# Patient Record
Sex: MALE | Race: WHITE | NOT HISPANIC OR LATINO | Employment: OTHER | URBAN - METROPOLITAN AREA
[De-identification: names, ages, dates, MRNs, and addresses within clinical notes are randomized per-mention and may not be internally consistent; named-entity substitution may affect disease eponyms.]

---

## 2023-10-10 ENCOUNTER — LAB (OUTPATIENT)
Dept: LAB | Facility: LAB | Age: 73
End: 2023-10-10
Payer: MEDICARE

## 2023-10-10 DIAGNOSIS — M54.16 RADICULOPATHY, LUMBAR REGION: Primary | ICD-10-CM

## 2023-10-10 PROCEDURE — 36415 COLL VENOUS BLD VENIPUNCTURE: CPT

## 2023-10-10 PROCEDURE — 82565 ASSAY OF CREATININE: CPT

## 2023-10-10 PROCEDURE — 84520 ASSAY OF UREA NITROGEN: CPT

## 2023-10-11 LAB
BUN SERPL-MCNC: 18 MG/DL (ref 6–23)
CREAT SERPL-MCNC: 1.04 MG/DL (ref 0.5–1.3)
GFR SERPL CREATININE-BSD FRML MDRD: 76 ML/MIN/1.73M*2

## 2023-10-21 ENCOUNTER — HOSPITAL ENCOUNTER (OUTPATIENT)
Dept: RADIOLOGY | Facility: HOSPITAL | Age: 73
Discharge: HOME | End: 2023-10-21
Payer: MEDICARE

## 2023-10-21 DIAGNOSIS — M54.16 RADICULOPATHY, LUMBAR REGION: ICD-10-CM

## 2023-10-21 PROCEDURE — 2550000001 HC RX 255 CONTRASTS: Performed by: PHYSICAL MEDICINE & REHABILITATION

## 2023-10-21 PROCEDURE — 72158 MRI LUMBAR SPINE W/O & W/DYE: CPT

## 2023-10-21 PROCEDURE — A9575 INJ GADOTERATE MEGLUMI 0.1ML: HCPCS | Performed by: PHYSICAL MEDICINE & REHABILITATION

## 2023-10-21 RX ORDER — GADOTERATE MEGLUMINE 376.9 MG/ML
20 INJECTION INTRAVENOUS
Status: COMPLETED | OUTPATIENT
Start: 2023-10-21 | End: 2023-10-21

## 2023-10-21 RX ADMIN — GADOTERATE MEGLUMINE 20 ML: 376.9 INJECTION INTRAVENOUS at 13:17

## 2023-11-01 ENCOUNTER — TELEMEDICINE (OUTPATIENT)
Dept: NEUROLOGY | Facility: HOSPITAL | Age: 73
End: 2023-11-01
Payer: MEDICARE

## 2023-11-01 DIAGNOSIS — G20.A1 PARKINSON'S DISEASE WITHOUT DYSKINESIA OR FLUCTUATING MANIFESTATIONS (MULTI): Primary | ICD-10-CM

## 2023-11-01 PROCEDURE — 99215 OFFICE O/P EST HI 40 MIN: CPT | Performed by: STUDENT IN AN ORGANIZED HEALTH CARE EDUCATION/TRAINING PROGRAM

## 2023-11-01 PROCEDURE — 99215 OFFICE O/P EST HI 40 MIN: CPT | Mod: GC,95 | Performed by: STUDENT IN AN ORGANIZED HEALTH CARE EDUCATION/TRAINING PROGRAM

## 2023-11-01 RX ORDER — CARBIDOPA AND LEVODOPA 25; 100 MG/1; MG/1
TABLET ORAL
Qty: 90 TABLET | Refills: 5 | Status: SHIPPED | OUTPATIENT
Start: 2023-11-01

## 2023-11-01 ASSESSMENT — UNIFIED PARKINSONS DISEASE RATING SCALE (UPDRS)
POSTURE: 1
PRONATION_SUPINATION_LEFT: 0
AMPLITUDE_RLE: 0
HANDMOVEMENTS_RIGHT: 2
CONSTANCY_TREMOR_ATREST: 1
FINGER_TAPPING_RIGHT: 2
FACIAL_EXPRESSION: 2
AMPLITUDE_LLE: 0
SPONTANEITY_OF_MOVEMENT: 1
CHAIR_RISING_SCALE: 0
GAIT: 0
AMPLITUDE_LIP_JAW: 0
AMPLITUDE_LUE: 0
FINGER_TAPPING_LEFT: 1
PRONATION_SUPINATION_RIGHT: 1
TOETAPPING_RIGHT: 2
AMPLITUDE_RUE: 2
SPEECH: 1
LEVODOPA: NO
TOETAPPING_LEFT: 1
PARKINSONS_MEDS: NO

## 2023-11-01 NOTE — PROGRESS NOTES
Subjective     Florentin Aguayo is a right handed  73 y.o. year old male who presents with No chief complaint on file..   Visit type: follow up visit virtual for untreated PD    Getting steroid injections which has helped his back pain. He is having excessive drooling which affects his speech, this is mainly at night. No dysphagia. Wife feels cognition is declining, though remains independent. He is having mild trouble keeping up with conversation. No VH. He is having secondary insomnia and wonders if related to pain meds. His motor symptoms seem to be relatively stable, micrographia bothers him mildly. Tremor is not bothersome. Walking is improving with back pain treatment. No bowel or bladder issues. He is exercising as much as he can with walking mainly.    Prior HX:  Today, he states the tremor started in the right hand a few months ago in the spring. It is intermittent and seems to be present more with walking or with rest. There are some days it is not present at all. He has not noticed tremor with activity. He does notice some shakiness in his writing. The tremor is improved by moving his right hand. He has not noticed any triggers or exacerbating factors. He denies tremor involving the left hand, head, voice, or lower extremities. He denies improvement with alcohol (very rare consumption). He denies family history of tremor or Parkinson's disease. He denies slowed movements, stiffness, or difficulty with balance or gait. He denies any use of neuroleptic medications.     He has noticed constipation for the last 2 years, in which he has had 1 bowel movement every 2-3 days. He denies any hypophonia, change in smell (but never very good). His wife has moved to the guest room as he flails his arms, kicks/moves legs like riding a bicycle, and yells out in his sleep. He has vivid dreams. He denies mood changes, VH, or memory problems.     He was told his PD-Generation testing was negative.     Home Meds:  -ASA 81  mg daily  -Atorvastatin 80 mg daily  -Chlorthalidone 25 mg daily  -Ezetimibe 10 mg daily  -Flonase  -Levothyroxine 150 mcg daily  -Lisinopril 40 mg daily  -Metformin 500 mg bid  -Metoprolol tartrate 25 mg bid    There is no problem list on file for this patient.     Past Medical History:   Diagnosis Date    Personal history of other diseases of the circulatory system     History of hypertension    Personal history of other diseases of the circulatory system     History of coronary artery disease    Personal history of other endocrine, nutritional and metabolic disease     History of diabetes mellitus      Past Surgical History:   Procedure Laterality Date    OTHER SURGICAL HISTORY  02/15/2022    Back surgery    OTHER SURGICAL HISTORY  02/15/2022    Hip surgery      Social History     Socioeconomic History    Marital status:      Spouse name: Not on file    Number of children: Not on file    Years of education: Not on file    Highest education level: Not on file   Occupational History    Not on file   Tobacco Use    Smoking status: Not on file    Smokeless tobacco: Not on file   Substance and Sexual Activity    Alcohol use: Not on file    Drug use: Not on file    Sexual activity: Not on file   Other Topics Concern    Not on file   Social History Narrative    Not on file     Social Determinants of Health     Financial Resource Strain: Not on file   Food Insecurity: Not on file   Transportation Needs: Not on file   Physical Activity: Not on file   Stress: Not on file   Social Connections: Not on file   Intimate Partner Violence: Not on file   Housing Stability: Not on file      No family history on file.              Review of Systems  All other system have been reviewed and are negative for complaint.  Objective   Neurological Exam  VV exam: reduced blink rate, EOM full range  Gait: Reduced R>L armswing ; mild shuffling ; rest tremor only present during gait    MDS UPDRS 1st Score: Motor Examination  Is the  patient on medication for treating the symptoms of Parkinson's Disease?: No  Is the patient on Levodopa?: No  Speech: 1  Facial Expression: 2  Finger Tapping Right Hand: 2  Finger Tapping Left Hand: 1  Hand Movements- Right Hand: 2  Hand Movements- Left Hand: 1  Pronatiaon-Supination Movments - Right Hand: 1  Pronatiaon-Supination Movments Left Hand: 0  Toe Tapping Right Foot: 2  Toe Tapping - Left Foot: 1  Arising from Chair: 0  Gait: 0  Posture: 1  Global Spontanteity of Movment ( Body Bradykinesia): 1  Rest Tremor Amplitude - RUE: 2  Rest Tremor Amplitude - LUE: 0  Rest Tremor Amplitude - RLE: 0  Rest Tremor Amplitude - LLE: 0  Rest Tremor Amplitude - Lip/Jaw: 0  Constancy of Rest Tremor: 1                  Assessment/Plan   Diagnoses and all orders for this visit:  Parkinson's disease without dyskinesia or fluctuating manifestations  -     carbidopa-levodopa (Sinemet)  mg tablet; 1/2 tab 3 times a day for a week, then 1 tab 3 times a day Take with meals    Florentin Aguayo is a 73 y.o. year old male here for virtual FUV for untreated PD. He has mild progression of bradykinesia and we decided to initiate Sinemet today. Tremor remains mild and only present during gait.    We discussed the following plan today:   We will plan to start a medication called carbidopa/levodopa (Sinemet) as it might help with shuffling gait, slowness, stiffness, and tremor - start with 1/2 tab three times a day for a week, then increase to 1 tab 3 times a day. Try to take it 30 minutes after meals. Side effects include dizziness, confusion, hallucinations, drowsiness, wiggly movements, and upset stomach.   An important part of the treatment is exercise. I recommend 20-30 minutes of moderate to high intensity cardiovascular exercise at least 3 times per week. Stationary bike, recumbent bike, or pool exercise can be considered if you have pain or balance issues. You can also check out the local Parkinson's exercise classes.  Mediterranean diet has also been shown to have benefits in Parkinson disease.   Return in 3 months in person ; MOCA next visit

## 2023-11-01 NOTE — PATIENT INSTRUCTIONS
Thank you for your visit today. You were seen by Dr. Nash for Parkinson disease. If you have any questions or need to reach me, call my office at [phone].    We discussed the following plan today:   We will plan to start a medication called carbidopa/levodopa (Sinemet) as it might help with shuffling gait, slowness, stiffness, and tremor - start with 1/2 tab three times a day for a week, then increase to 1 tab 3 times a day. Try to take it 30 minutes after meals. Side effects include dizziness, confusion, hallucinations, drowsiness, wiggly movements, and upset stomach.   An important part of the treatment is exercise. I recommend 20-30 minutes of moderate to high intensity cardiovascular exercise at least 3 times per week. Stationary bike, recumbent bike, or pool exercise can be considered if you have pain or balance issues. You can also check out the local Parkinson's exercise classes. Mediterranean diet has also been shown to have benefits in Parkinson disease.   Return in 3 months in person ; memory testing next visit

## 2024-01-22 ENCOUNTER — APPOINTMENT (OUTPATIENT)
Dept: RADIOLOGY | Facility: HOSPITAL | Age: 74
End: 2024-01-22
Payer: MEDICARE

## 2024-01-22 ENCOUNTER — HOSPITAL ENCOUNTER (OUTPATIENT)
Dept: RADIOLOGY | Facility: HOSPITAL | Age: 74
Discharge: HOME | End: 2024-01-22
Payer: MEDICARE

## 2024-01-22 ENCOUNTER — HOSPITAL ENCOUNTER (OUTPATIENT)
Dept: CARDIOLOGY | Facility: HOSPITAL | Age: 74
Discharge: HOME | End: 2024-01-22
Payer: MEDICARE

## 2024-01-22 ENCOUNTER — APPOINTMENT (OUTPATIENT)
Dept: CARDIOLOGY | Facility: HOSPITAL | Age: 74
End: 2024-01-22
Payer: MEDICARE

## 2024-01-22 DIAGNOSIS — I25.10 ATHEROSCLEROTIC HEART DISEASE OF NATIVE CORONARY ARTERY WITHOUT ANGINA PECTORIS: ICD-10-CM

## 2024-01-22 DIAGNOSIS — E78.00 PURE HYPERCHOLESTEROLEMIA, UNSPECIFIED: ICD-10-CM

## 2024-01-22 PROCEDURE — 93017 CV STRESS TEST TRACING ONLY: CPT

## 2024-01-22 PROCEDURE — 3430000001 HC RX 343 DIAGNOSTIC RADIOPHARMACEUTICALS: Performed by: INTERNAL MEDICINE

## 2024-01-22 PROCEDURE — 93017 CV STRESS TEST TRACING ONLY: CPT | Mod: MUE

## 2024-01-22 PROCEDURE — 93015 CV STRESS TEST SUPVJ I&R: CPT | Performed by: INTERNAL MEDICINE

## 2024-01-22 PROCEDURE — 78452 HT MUSCLE IMAGE SPECT MULT: CPT

## 2024-01-22 PROCEDURE — A9502 TC99M TETROFOSMIN: HCPCS | Performed by: INTERNAL MEDICINE

## 2024-01-22 RX ADMIN — TETROFOSMIN 35.6 MILLICURIE: 0.23 INJECTION, POWDER, LYOPHILIZED, FOR SOLUTION INTRAVENOUS at 09:42

## 2024-01-22 RX ADMIN — TETROFOSMIN 11.9 MILLICURIE: 0.23 INJECTION, POWDER, LYOPHILIZED, FOR SOLUTION INTRAVENOUS at 08:15

## 2024-01-23 ENCOUNTER — APPOINTMENT (OUTPATIENT)
Dept: RADIOLOGY | Facility: HOSPITAL | Age: 74
End: 2024-01-23
Payer: MEDICARE

## 2024-01-26 ENCOUNTER — LAB (OUTPATIENT)
Dept: LAB | Facility: LAB | Age: 74
End: 2024-01-26
Payer: MEDICARE

## 2024-01-26 DIAGNOSIS — R79.89 ELEVATED TSH: ICD-10-CM

## 2024-01-26 DIAGNOSIS — E11.9 TYPE 2 DIABETES MELLITUS WITHOUT COMPLICATIONS (MULTI): ICD-10-CM

## 2024-01-26 DIAGNOSIS — I10 ESSENTIAL (PRIMARY) HYPERTENSION: Primary | ICD-10-CM

## 2024-01-26 DIAGNOSIS — R79.89 ELEVATED TSH: Primary | ICD-10-CM

## 2024-01-26 DIAGNOSIS — E03.9 HYPOTHYROIDISM, UNSPECIFIED: ICD-10-CM

## 2024-01-26 DIAGNOSIS — E78.00 PURE HYPERCHOLESTEROLEMIA, UNSPECIFIED: ICD-10-CM

## 2024-01-26 LAB
ALBUMIN SERPL-MCNC: 3.9 G/DL (ref 3.5–5)
ALP BLD-CCNC: 52 U/L (ref 35–125)
ALT SERPL-CCNC: 21 U/L (ref 5–40)
ANION GAP SERPL CALC-SCNC: 12 MMOL/L
AST SERPL-CCNC: 25 U/L (ref 5–40)
BASOPHILS # BLD AUTO: 0.07 X10*3/UL (ref 0–0.1)
BASOPHILS NFR BLD AUTO: 1.1 %
BILIRUB SERPL-MCNC: 0.4 MG/DL (ref 0.1–1.2)
BUN SERPL-MCNC: 19 MG/DL (ref 8–25)
CALCIUM SERPL-MCNC: 9.3 MG/DL (ref 8.5–10.4)
CHLORIDE SERPL-SCNC: 101 MMOL/L (ref 97–107)
CHOLEST SERPL-MCNC: 118 MG/DL (ref 133–200)
CHOLEST/HDLC SERPL: 3.6 {RATIO}
CO2 SERPL-SCNC: 28 MMOL/L (ref 24–31)
CREAT SERPL-MCNC: 1.2 MG/DL (ref 0.4–1.6)
EGFRCR SERPLBLD CKD-EPI 2021: 63 ML/MIN/1.73M*2
EOSINOPHIL # BLD AUTO: 0.34 X10*3/UL (ref 0–0.4)
EOSINOPHIL NFR BLD AUTO: 5.1 %
ERYTHROCYTE [DISTWIDTH] IN BLOOD BY AUTOMATED COUNT: 13.2 % (ref 11.5–14.5)
EST. AVERAGE GLUCOSE BLD GHB EST-MCNC: 157 MG/DL
GLUCOSE SERPL-MCNC: 154 MG/DL (ref 65–99)
HBA1C MFR BLD: 7.1 %
HCT VFR BLD AUTO: 44.1 % (ref 41–52)
HDLC SERPL-MCNC: 33 MG/DL
HGB BLD-MCNC: 15.1 G/DL (ref 13.5–17.5)
IMM GRANULOCYTES # BLD AUTO: 0.01 X10*3/UL (ref 0–0.5)
IMM GRANULOCYTES NFR BLD AUTO: 0.2 % (ref 0–0.9)
LDLC SERPL CALC-MCNC: 59 MG/DL (ref 65–130)
LYMPHOCYTES # BLD AUTO: 1.83 X10*3/UL (ref 0.8–3)
LYMPHOCYTES NFR BLD AUTO: 27.5 %
MCH RBC QN AUTO: 31.6 PG (ref 26–34)
MCHC RBC AUTO-ENTMCNC: 34.2 G/DL (ref 32–36)
MCV RBC AUTO: 92 FL (ref 80–100)
MONOCYTES # BLD AUTO: 0.67 X10*3/UL (ref 0.05–0.8)
MONOCYTES NFR BLD AUTO: 10.1 %
NEUTROPHILS # BLD AUTO: 3.74 X10*3/UL (ref 1.6–5.5)
NEUTROPHILS NFR BLD AUTO: 56 %
NRBC BLD-RTO: 0 /100 WBCS (ref 0–0)
PLATELET # BLD AUTO: 187 X10*3/UL (ref 150–450)
POTASSIUM SERPL-SCNC: 4 MMOL/L (ref 3.4–5.1)
PROT SERPL-MCNC: 6.2 G/DL (ref 5.9–7.9)
RBC # BLD AUTO: 4.78 X10*6/UL (ref 4.5–5.9)
SODIUM SERPL-SCNC: 141 MMOL/L (ref 133–145)
T4 FREE SERPL-MCNC: 1.1 NG/DL (ref 0.9–1.7)
TRIGL SERPL-MCNC: 129 MG/DL (ref 40–150)
TSH SERPL DL<=0.05 MIU/L-ACNC: 5.03 MIU/L (ref 0.27–4.2)
WBC # BLD AUTO: 6.7 X10*3/UL (ref 4.4–11.3)

## 2024-01-26 PROCEDURE — 83036 HEMOGLOBIN GLYCOSYLATED A1C: CPT

## 2024-01-26 PROCEDURE — 80061 LIPID PANEL: CPT

## 2024-01-26 PROCEDURE — 84439 ASSAY OF FREE THYROXINE: CPT

## 2024-01-26 PROCEDURE — 36415 COLL VENOUS BLD VENIPUNCTURE: CPT

## 2024-01-26 PROCEDURE — 85025 COMPLETE CBC W/AUTO DIFF WBC: CPT

## 2024-01-26 PROCEDURE — 80053 COMPREHEN METABOLIC PANEL: CPT

## 2024-01-26 PROCEDURE — 84443 ASSAY THYROID STIM HORMONE: CPT

## 2024-01-29 PROCEDURE — 93016 CV STRESS TEST SUPVJ ONLY: CPT | Performed by: INTERNAL MEDICINE

## 2024-01-29 NOTE — ADDENDUM NOTE
Encounter addended by: Sunshine Vogel MD on: 1/29/2024 8:33 AM   Actions taken: Charge Capture section accepted

## 2024-02-06 ENCOUNTER — LAB (OUTPATIENT)
Dept: LAB | Facility: LAB | Age: 74
End: 2024-02-06
Payer: MEDICARE

## 2024-02-06 DIAGNOSIS — M79.10 MYALGIA, UNSPECIFIED SITE: Primary | ICD-10-CM

## 2024-02-06 DIAGNOSIS — K21.9 GASTROESOPHAGEAL REFLUX DISEASE WITHOUT ESOPHAGITIS: Primary | ICD-10-CM

## 2024-02-06 LAB — CK SERPL-CCNC: 286 U/L (ref 24–195)

## 2024-02-06 PROCEDURE — 82550 ASSAY OF CK (CPK): CPT

## 2024-02-06 PROCEDURE — 36415 COLL VENOUS BLD VENIPUNCTURE: CPT

## 2024-02-07 RX ORDER — PANTOPRAZOLE SODIUM 40 MG/1
40 TABLET, DELAYED RELEASE ORAL DAILY
Qty: 90 TABLET | Refills: 1 | Status: SHIPPED | OUTPATIENT
Start: 2024-02-07

## 2024-02-15 ENCOUNTER — LAB (OUTPATIENT)
Dept: LAB | Facility: LAB | Age: 74
End: 2024-02-15
Payer: MEDICARE

## 2024-02-15 DIAGNOSIS — M79.10 MYALGIA, UNSPECIFIED SITE: Primary | ICD-10-CM

## 2024-02-15 LAB — CK SERPL-CCNC: 484 U/L (ref 24–195)

## 2024-02-15 PROCEDURE — 82550 ASSAY OF CK (CPK): CPT

## 2024-02-15 PROCEDURE — 36415 COLL VENOUS BLD VENIPUNCTURE: CPT

## 2024-02-16 ENCOUNTER — LAB (OUTPATIENT)
Dept: LAB | Facility: LAB | Age: 74
End: 2024-02-16
Payer: MEDICARE

## 2024-02-16 DIAGNOSIS — M79.10 MYALGIA, UNSPECIFIED SITE: Primary | ICD-10-CM

## 2024-02-16 LAB
ANION GAP SERPL CALC-SCNC: 12 MMOL/L
BUN SERPL-MCNC: 20 MG/DL (ref 8–25)
CALCIUM SERPL-MCNC: 9.4 MG/DL (ref 8.5–10.4)
CHLORIDE SERPL-SCNC: 100 MMOL/L (ref 97–107)
CO2 SERPL-SCNC: 28 MMOL/L (ref 24–31)
CREAT SERPL-MCNC: 1.1 MG/DL (ref 0.4–1.6)
EGFRCR SERPLBLD CKD-EPI 2021: 70 ML/MIN/1.73M*2
GLUCOSE SERPL-MCNC: 168 MG/DL (ref 65–99)
POTASSIUM SERPL-SCNC: 3.8 MMOL/L (ref 3.4–5.1)
SODIUM SERPL-SCNC: 140 MMOL/L (ref 133–145)

## 2024-02-16 PROCEDURE — 80048 BASIC METABOLIC PNL TOTAL CA: CPT

## 2024-02-16 PROCEDURE — 36415 COLL VENOUS BLD VENIPUNCTURE: CPT

## 2024-02-19 DIAGNOSIS — E61.1 LOW IRON: ICD-10-CM

## 2024-02-19 RX ORDER — FERROUS GLUCONATE 324(38)MG
1 TABLET ORAL DAILY
Qty: 90 TABLET | Refills: 1 | Status: SHIPPED | OUTPATIENT
Start: 2024-02-19

## 2024-02-20 ENCOUNTER — EVALUATION (OUTPATIENT)
Dept: PHYSICAL THERAPY | Facility: CLINIC | Age: 74
End: 2024-02-20
Payer: MEDICARE

## 2024-02-20 DIAGNOSIS — M48.062 SPINAL STENOSIS, LUMBAR REGION WITH NEUROGENIC CLAUDICATION: ICD-10-CM

## 2024-02-20 PROCEDURE — 97161 PT EVAL LOW COMPLEX 20 MIN: CPT | Mod: GP

## 2024-02-20 PROCEDURE — 97110 THERAPEUTIC EXERCISES: CPT | Mod: GP

## 2024-02-20 ASSESSMENT — PAIN SCALES - GENERAL: PAINLEVEL_OUTOF10: 2

## 2024-02-20 ASSESSMENT — PAIN - FUNCTIONAL ASSESSMENT: PAIN_FUNCTIONAL_ASSESSMENT: 0-10

## 2024-02-20 NOTE — PROGRESS NOTES
Physical Therapy Evaluation    Patient Name: Florentin Aguayo  MRN: [id number]  Evaluation Date: 2/20/2024  Time Calculation  Start Time: 1005  Stop Time: 1045  Time Calculation (min): 40 min  PT Evaluation Time Entry  PT Evaluation (Low) Time Entry: 29     PT Therapeutic Procedures Time Entry  Therapeutic Exercise Time Entry: 11    1. Spinal stenosis, lumbar region with neurogenic claudication  Referral to Physical Therapy    Follow Up In Physical Therapy           Subjective   Patient reported hx of injury: Pt has long hx of low back pain and dysfunction leading into surgery in 2004 and fusion L3-S1 in 2022. Pt was doing well in pain and function until this fall when pain returned. Pt notices pain across the low back in standing, weakness in the LLE, as well as cramping in the L thigh. Pt has some foot drag with LLE, denies falls. Pt has done PT in the past on land and in the pool. He currently is doing independent exercise in the pool, returns to therapy now to progress land-based exercise. Pt was also recommended for injections, but is adjusting heart medication before considering injections.     Surgery:   2022 L3-S1 fusion.     Precautions:  Precautions  Precautions Comment: Hx fusion 2022, no remaining restrictions    Relevant PMH:  Hx L TKA 2014, L3-S1 fusion 2022, thyroid disorder, diabetes, heart disease, HTN, Parkinson's     Red flags: negative    Pain:  Pain Assessment: 0-10  Pain Score: 2 (Pain at max 8/10)  Pain Type: Chronic pain  Pain Location: Back  Pain Orientation: Lower  Pain Radiating Towards: radiates into anterior L thigh  Effect of Pain on Daily Activities: Limits standing, walking to 5 minutes max, weakness with ascending stairs, household chores, donning shoes/socks    Home Living:  Home type: House  Stairs: Yes  Lives with: Spouse  Occupation: retired  Hobbies/activities: exercising, walking    Prior Function Per Pt/Caregiver Report:  Functionally independent with some modifications, no  pain    Imaging:  October 2023:  IMPRESSION:  1. Interval postsurgical changes laminectomy and posterior spinal  fusion from L3 through S1. Remote postsurgical changes of laminectomy  at L2-3.  2. Facet osteoarthropathy and scarring is seen at the L2-3 level  which contributes to mild central canal and neural foraminal  narrowing.    OBJECTIVE:  Objective   Posture:  Posture Comment: Mild flexed trunk, forward head, rounded shoulders  Range of Motion:  Lumbar ROM Range   Flexion 50% limited, painful   Extension 25% limited   R rotation 25% limited   L rotation 25% limited   R sidebend 50% limited, tightness felt   L sidebend 50% limited, tightness felt     Strength:  LE MMT L R   Hip flexion 4/5 4+/5   Hip extension 4/5 4+/5   Hip abduction 4/5 4+/5   Hip adduction 4/5 4+/5   Knee flexion 4+/5 5/5   Knee extension 4+/5 5/5   Ankle DF 4/5 4+/5     Flexibility:  Flexibility Comment: Very tight B hip flexors, glutes/piriformis, hamstrings, gastroc  Palpation:  Palpation Comment: mild tenderness L>R lumbar paraspinals  Special Tests:  Special Tests Comment: negative slump, SLR  Gait:  Gait Comment: decreased mary, increased time in double stance, mild increase in L knee and hip flexion during swing phase to compensate for foot drag. Pain increases with distance.  Stairs:  Stairs Comment: Ascends nonreciprocally with pain, descends reciprocally  Bed Mobility:  Bed Mobility Comment: Independent with mild pain  Transfers:  Transfers Comment: Independent with sit to stand, mild pain      Outcome Measures:  Other Measures  5x Sit to Stand: 15.8s with some pain  Oswestry Disablity Index (NBA): 58%     Assessment  PT Assessment Results: Decreased strength, Decreased range of motion, Decreased mobility, Pain  Rehab Prognosis: Good    Pt is a 73 y.o. male who presents with impairments of LBP with L sided radiculopathy, core and BLE weakness, decreased flexibility, decreased lumbar ROM, and faulty gait. These impairments have  led to functional limitations including modified performance of ADLs and limited tolerance of standing, walking, stairs, lifting, and household tasks. Pt would benefit from skilled physical therapy intervention to improve above impairments and facilitate return to function.    Plan  Treatment/Interventions: Aquatic therapy, Education/ Instruction, Electrical stimulation, Manual therapy, Neuromuscular re-education, Taping techniques, Therapeutic activities, Therapeutic exercises, Ultrasound  PT Plan: Skilled PT  PT Frequency: 1 time per week  Duration: 8 visits  Certification Period Start Date: 02/20/24  Certification Period End Date: 05/20/24  Number of Treatments Authorized: MN  Rehab Potential: Good  Plan of Care Agreement: Patient    Plan for next visit: progress flexibility, core stability    OP EDUCATION:  Outpatient Education  Individual(s) Educated: Patient  Education Provided: Body Mechanics, Anatomy, Home Exercise Program, POC, Posture  Diagnosis and Precautions: Low back pain with LLE radiculopathy, decreased functional mobility  Risk and Benefits Discussed with Patient/Caregiver/Other: yes  Patient/Caregiver Demonstrated Understanding: yes  Plan of Care Discussed and Agreed Upon: yes  Patient Response to Education: Patient/Caregiver Verbalized Understanding of Information, Patient/Caregiver Performed Return Demonstration of Exercises/Activities, Patient/Caregiver Asked Appropriate Questions    Today's Treatment:  Therapeutic Exercise  HEP to be completed daily, exercises include:  Figure 4 piriformis stretch  SKTC stretch  90/90 hamstring stretch  Seated gastroc stretch    Goals:  Active       PT Problem       STG, 4 visits:       Start:  02/20/24    Expected End:  04/05/24       Pt will be independent in HEP to improve LE and core strength, mobility, and function.  Pt will report 50% reduction in pain with functional mobility such as standing, walking, and stairs.         Pt will increase strength in  core and LEs by 1/2 MMT in all planes for improved performance of functional mobility.        Start:  02/20/24    Expected End:  05/20/24            Pt will demonstrate no more than 25% limitation in lumbar AROM without pain in all planes for improved performance of ADLs.        Start:  02/20/24    Expected End:  05/20/24            Pt will ambulate community distances across all surfaces without deviation and without pain.       Start:  02/20/24    Expected End:  05/20/24            Pt will ascend/descend 2 flights of stairs reciprocally without deviation and without pain for improved household and community mobility.       Start:  02/20/24    Expected End:  05/20/24            Pt will tolerate >30 minutes of standing activity without pain for improved tolerance of household tasks.       Start:  02/20/24    Expected End:  05/20/24            Pt will perform all LE dressing without pain, limitation, or modification.       Start:  02/20/24    Expected End:  05/20/24

## 2024-02-22 ENCOUNTER — TELEPHONE (OUTPATIENT)
Dept: PRIMARY CARE | Facility: CLINIC | Age: 74
End: 2024-02-22

## 2024-02-22 ENCOUNTER — LAB (OUTPATIENT)
Dept: LAB | Facility: LAB | Age: 74
End: 2024-02-22
Payer: MEDICARE

## 2024-02-22 ENCOUNTER — OFFICE VISIT (OUTPATIENT)
Dept: PRIMARY CARE | Facility: CLINIC | Age: 74
End: 2024-02-22
Payer: MEDICARE

## 2024-02-22 VITALS
BODY MASS INDEX: 40.09 KG/M2 | SYSTOLIC BLOOD PRESSURE: 122 MMHG | RESPIRATION RATE: 18 BRPM | DIASTOLIC BLOOD PRESSURE: 78 MMHG | HEART RATE: 61 BPM | WEIGHT: 280 LBS | HEIGHT: 70 IN | OXYGEN SATURATION: 94 %

## 2024-02-22 DIAGNOSIS — E03.9 HYPOTHYROIDISM, UNSPECIFIED TYPE: ICD-10-CM

## 2024-02-22 DIAGNOSIS — M79.10 MYALGIA, UNSPECIFIED SITE: Primary | ICD-10-CM

## 2024-02-22 DIAGNOSIS — Z00.00 WELL ADULT EXAM: Primary | ICD-10-CM

## 2024-02-22 DIAGNOSIS — E78.00 HYPERCHOLESTEROLEMIA: ICD-10-CM

## 2024-02-22 DIAGNOSIS — I25.10 ARTERIOSCLEROSIS OF CORONARY ARTERY: ICD-10-CM

## 2024-02-22 DIAGNOSIS — I10 BENIGN ESSENTIAL HYPERTENSION: ICD-10-CM

## 2024-02-22 PROBLEM — B35.1 ONYCHOMYCOSIS: Status: RESOLVED | Noted: 2019-07-18 | Resolved: 2024-02-22

## 2024-02-22 PROBLEM — E11.9 DIABETES (MULTI): Status: ACTIVE | Noted: 2021-09-14

## 2024-02-22 PROBLEM — Z86.79 HISTORY OF HYPERTENSION: Status: ACTIVE | Noted: 2024-02-22

## 2024-02-22 PROBLEM — G20.A1 PARKINSON'S DISEASE (MULTI): Status: ACTIVE | Noted: 2022-10-04

## 2024-02-22 PROBLEM — G47.30 SLEEP APNEA: Status: RESOLVED | Noted: 2018-05-17 | Resolved: 2024-02-22

## 2024-02-22 PROBLEM — K21.9 CHRONIC GERD: Status: ACTIVE | Noted: 2019-09-04

## 2024-02-22 PROBLEM — N18.2 STAGE 2 CHRONIC KIDNEY DISEASE: Status: ACTIVE | Noted: 2022-10-04

## 2024-02-22 PROBLEM — Z86.0100 PERSONAL HISTORY OF COLONIC POLYPS: Status: RESOLVED | Noted: 2024-02-22 | Resolved: 2024-02-22

## 2024-02-22 PROBLEM — N40.0 BENIGN PROSTATIC HYPERPLASIA: Status: ACTIVE | Noted: 2024-02-22

## 2024-02-22 PROBLEM — H91.90 HEARING LOSS: Status: RESOLVED | Noted: 2022-01-06 | Resolved: 2024-02-22

## 2024-02-22 PROBLEM — H90.3 ASYMMETRIC SNHL (SENSORINEURAL HEARING LOSS): Status: ACTIVE | Noted: 2024-02-22

## 2024-02-22 PROBLEM — Z86.010 PERSONAL HISTORY OF COLONIC POLYPS: Status: RESOLVED | Noted: 2024-02-22 | Resolved: 2024-02-22

## 2024-02-22 PROBLEM — Z86.39 HISTORY OF DIABETES MELLITUS: Status: ACTIVE | Noted: 2024-02-22

## 2024-02-22 PROBLEM — D50.0 IRON DEFICIENCY ANEMIA DUE TO CHRONIC BLOOD LOSS: Status: ACTIVE | Noted: 2024-02-22

## 2024-02-22 PROBLEM — M16.9 OSTEOARTHRITIS OF HIP: Status: RESOLVED | Noted: 2022-03-31 | Resolved: 2024-02-22

## 2024-02-22 PROBLEM — M43.26 FUSION OF LUMBAR SPINE: Status: RESOLVED | Noted: 2022-11-01 | Resolved: 2024-02-22

## 2024-02-22 PROBLEM — G47.31 CENTRAL SLEEP APNEA SYNDROME: Status: ACTIVE | Noted: 2024-02-22

## 2024-02-22 LAB — CK SERPL-CCNC: 326 U/L (ref 24–195)

## 2024-02-22 PROCEDURE — 3078F DIAST BP <80 MM HG: CPT | Performed by: FAMILY MEDICINE

## 2024-02-22 PROCEDURE — 1170F FXNL STATUS ASSESSED: CPT | Performed by: FAMILY MEDICINE

## 2024-02-22 PROCEDURE — 1157F ADVNC CARE PLAN IN RCRD: CPT | Performed by: FAMILY MEDICINE

## 2024-02-22 PROCEDURE — 99213 OFFICE O/P EST LOW 20 MIN: CPT | Performed by: FAMILY MEDICINE

## 2024-02-22 PROCEDURE — G0439 PPPS, SUBSEQ VISIT: HCPCS | Performed by: FAMILY MEDICINE

## 2024-02-22 PROCEDURE — 1036F TOBACCO NON-USER: CPT | Performed by: FAMILY MEDICINE

## 2024-02-22 PROCEDURE — 1125F AMNT PAIN NOTED PAIN PRSNT: CPT | Performed by: FAMILY MEDICINE

## 2024-02-22 PROCEDURE — 82550 ASSAY OF CK (CPK): CPT

## 2024-02-22 PROCEDURE — 3074F SYST BP LT 130 MM HG: CPT | Performed by: FAMILY MEDICINE

## 2024-02-22 PROCEDURE — 1159F MED LIST DOCD IN RCRD: CPT | Performed by: FAMILY MEDICINE

## 2024-02-22 PROCEDURE — 3051F HG A1C>EQUAL 7.0%<8.0%: CPT | Performed by: FAMILY MEDICINE

## 2024-02-22 PROCEDURE — 36415 COLL VENOUS BLD VENIPUNCTURE: CPT

## 2024-02-22 PROCEDURE — 3048F LDL-C <100 MG/DL: CPT | Performed by: FAMILY MEDICINE

## 2024-02-22 RX ORDER — METOPROLOL SUCCINATE 50 MG/1
1 TABLET, EXTENDED RELEASE ORAL DAILY
COMMUNITY

## 2024-02-22 RX ORDER — LEVOTHYROXINE SODIUM 150 UG/1
150 TABLET ORAL DAILY
COMMUNITY
End: 2024-04-05

## 2024-02-22 RX ORDER — CHLORTHALIDONE 25 MG/1
1 TABLET ORAL DAILY
COMMUNITY
Start: 2020-09-21

## 2024-02-22 RX ORDER — DEXTROSE 4 G
TABLET,CHEWABLE ORAL
COMMUNITY
Start: 2022-11-01

## 2024-02-22 RX ORDER — METFORMIN HYDROCHLORIDE 500 MG/1
TABLET ORAL EVERY 12 HOURS
COMMUNITY
End: 2024-03-14

## 2024-02-22 RX ORDER — BLOOD SUGAR DIAGNOSTIC
STRIP MISCELLANEOUS
COMMUNITY
End: 2024-03-06

## 2024-02-22 RX ORDER — ACETAMINOPHEN 160 MG/5ML
SUSPENSION, ORAL (FINAL DOSE FORM) ORAL
COMMUNITY
Start: 2023-12-15

## 2024-02-22 RX ORDER — FLUTICASONE PROPIONATE 50 MCG
SPRAY, SUSPENSION (ML) NASAL
COMMUNITY
Start: 2019-12-24

## 2024-02-22 RX ORDER — NAPROXEN SODIUM 220 MG/1
TABLET, FILM COATED ORAL
COMMUNITY

## 2024-02-22 ASSESSMENT — ENCOUNTER SYMPTOMS
DEPRESSION: 0
OCCASIONAL FEELINGS OF UNSTEADINESS: 0
LOSS OF SENSATION IN FEET: 0

## 2024-02-22 ASSESSMENT — ACTIVITIES OF DAILY LIVING (ADL)
DRESSING: INDEPENDENT
TAKING_MEDICATION: INDEPENDENT
MANAGING_FINANCES: INDEPENDENT
GROCERY_SHOPPING: INDEPENDENT
BATHING: INDEPENDENT
DOING_HOUSEWORK: INDEPENDENT

## 2024-02-22 ASSESSMENT — PATIENT HEALTH QUESTIONNAIRE - PHQ9
SUM OF ALL RESPONSES TO PHQ9 QUESTIONS 1 AND 2: 0
2. FEELING DOWN, DEPRESSED OR HOPELESS: NOT AT ALL
1. LITTLE INTEREST OR PLEASURE IN DOING THINGS: NOT AT ALL

## 2024-02-22 ASSESSMENT — PAIN SCALES - GENERAL: PAINLEVEL: 2

## 2024-02-22 NOTE — ASSESSMENT & PLAN NOTE
Continue Toprol, Altace and chlorthalidone and following with Dr. Hahn.   Secondary Intention Text (Leave Blank If You Do Not Want): The defect will heal with secondary intention.

## 2024-02-22 NOTE — PROGRESS NOTES
Subjective   Reason for Visit: Florentin Aguayo is an 73 y.o. male here for a Medicare Wellness visit.     Past Medical, Surgical, and Family History reviewed and updated in chart.    Reviewed all medications by prescribing practitioner or clinical pharmacist (such as prescriptions, OTCs, herbal therapies and supplements) and documented in the medical record.    HPI    Patient Care Team:  Lester Saenz MD as PCP - General  Lester Saenz MD as PCP - [employer] PCP  Lester Saenz MD as Primary Care Provider   He has a history of  polyps on colonoscopy remotely.  Colonoscopy by Dr. Bello in 10/17 showed gastritis and colitis but no polyps.      2. FLORENTIN is seen today also for follow up of High cholesterol.  He was on a atorvastatin but this was switched to Crestor by Dr. Hahn.  He subsequently had a reaction including elevated creatinine kinase and has been off of this.  He has not been replaced yet until he recovers from his elevated CPK.   Recent LDL is 59.     3. FLORENTIN is here also for follow up of benign essential hypertension.  He is on Toprol, Altace and chorthalidone by Dr. Hahn.     4. FLORENTIN is seen also for follow up of Hypothyroidism.  He is on levothyroxine 175  mcg.   recent TSH is Elevated with a normal thyroxine.     5. FLORENTIN is seen for also complaining of for GERD.  He is on Protonix.     6. FLORENTIN is seen today for follow-up of coronary artery disease.  He is S/P stent placement by Dr. Hahn.     7. FLORENTIN is seen also for follow up of Diabetes 2, non insulin requiring.  He is on metformin 500 mg BID . He is following a ADA diet. Recent A1 c is 7.1.      8.   He is also here for follow up of sleep apnea.  He was using CPAP machine but has found it to be too difficult to wherein he stopped using it.      9.   He  is also here for follow-up of anemia.  Gastrointestinal workup in 10/17 showed mild gastritis and colitis.   He was on iron but stopped this a year to go.   Recent hemoglobin is 11.7.      10. He is also here for follow-up of lumbar radiculitis.  He is status post lumbosacral fusion by Dr. Saleem in 10/22.    Review of Systems  Denies headache, blurred vision, chest pain, shortness of breath, nausea or vomiting, change in bowel habits or leg pain or swelling.    Objective   Vitals:  There were no vitals taken for this visit.      Physical Exam  General appearance: Vital signs have been reviewed.  Comfortable.  Well-nourished and well-developed.He is alert and oriented x3 and appears his stated age.The patient is cooperative with exam.  Head: Hair pattern reveals a normal pattern for patient's age and face shows no abnormalities.  Eyes: PERRLA x2, EOMI x2, conjunctive a and sclera are clear.  Ears: External bilateral ears with normal helix, tragus and earlobe.Bilateral canals are normal.Bilateral tympanic membranes are pearly gray and landmarks are well visualized.  Nose: Nasal mucosa both nostrils reveals no polyps, ulcerations or lesions.  Throat:Teeth are in good repair.  Posterior pharynx reveals no abnormalities.  Neck: Supple without lymphadenopathy, thyromegaly or carotid bruits.  Lungs:Clear to auscultation bilaterally with no wheezes, rales or rhonchi.  Cardiovascular: RRR without MRG.No carotid bruits.  Extremities without edema and pulses are intact.  Abdomen: Soft, NT, no masses, no hepatosplenomegaly.  Genitalia: No testicular masses.  No evidence of inguinal hernia.Nontender.  Rectal: No masses.  Prostate is normal in size and shape with no nodules. It is nontender.  Musculoskeletal: 5/5 and equal strength in bilateral upper and lower extremities.  Skin: Good turgor and without rashes.  Neurological: Good overall strength and no focal deficits.  Cranial nerves II through XII are grossly intact.  Psychiatric: Patient has appropriate judgment with good insight.  Mood is appropriate.    Assessment/Plan   Problem List Items Addressed This Visit    None

## 2024-02-26 ENCOUNTER — TREATMENT (OUTPATIENT)
Dept: PHYSICAL THERAPY | Facility: CLINIC | Age: 74
End: 2024-02-26
Payer: MEDICARE

## 2024-02-26 DIAGNOSIS — M48.062 SPINAL STENOSIS, LUMBAR REGION WITH NEUROGENIC CLAUDICATION: ICD-10-CM

## 2024-02-26 PROCEDURE — 97140 MANUAL THERAPY 1/> REGIONS: CPT | Mod: GP,CQ

## 2024-02-26 PROCEDURE — 97110 THERAPEUTIC EXERCISES: CPT | Mod: GP,CQ

## 2024-02-26 ASSESSMENT — PAIN SCALES - GENERAL: PAINLEVEL_OUTOF10: 2

## 2024-02-26 ASSESSMENT — PAIN - FUNCTIONAL ASSESSMENT: PAIN_FUNCTIONAL_ASSESSMENT: 0-10

## 2024-02-26 NOTE — TELEPHONE ENCOUNTER
Patient made 6 month appt after CPE. I could not determine if he would need labs or why he needed appt.

## 2024-02-26 NOTE — PROGRESS NOTES
"Physical Therapy Treatment    Patient Name: Florentin Aguayo  MRN: [id number]  Encounter date:  2/26/2024  Time Calculation  Start Time: 0933  Stop Time: 1018  Time Calculation (min): 45 min     PT Therapeutic Procedures Time Entry  Manual Therapy Time Entry: 27  Therapeutic Exercise Time Entry: 14    Visit Number:  2 (including evaluation)  Planned total visits: 7  Visit Authorized/insurance coverage:  7      Current Problem  1. Spinal stenosis, lumbar region with neurogenic claudication  Follow Up In Physical Therapy          Surgery  2022 L3-S1 fusion.        Precautions   Precautions Comment: Hx fusion 2022, no remaining restrictions       Pain  Pain Assessment: 0-10  Pain Score: 2  Pain Type: Chronic pain  Pain Location: Back  Pain Orientation: Lower  Pain Radiating Towards: radiates into anterior L thgh    Subjective  General  General Comment: Patient reports that he does not have pain while sitting but quickly increases when standing across lower back when standing or walking for very short periods..     Relevant PMH:  Hx L TKA 2014, L3-S1 fusion 2022, thyroid disorder, diabetes, heart disease, HTN, Parkinson's      Red flags: negative    Objective  Poor/fair transfers secondary to pain.     Treatment:  Therapeutic Exercise  Therapeutic Exercise Performed: Yes  upine HS stretch with strap 3 x 20 sec R/L  Supine piriformis stretch with strap 3 x20 sec R/L  Seated abd bracing 5\" x20   Billed Treatment Times:  Therapeutic Exercise 14 min    Current HEP:  Supine HS stretch with strap 3 x 20 sec R/L  Supine piriformis stretch with strap 3 x20 sec R/L  Seated abd bracing 5\" x20     Manual Therapy  Manual Therapy Performed: Yes  Manual:    MFR/S lumbar paraspinals  L piriformis DTM/release  MFR/STM L ITB, L thigh from groin to proximal knee  Billed Treatment Times:  Manual Therapy  27 min        Assessment:  PT Assessment  Assessment Comment: Patient displays tightness in low lumbar back, L thigh, piriformis, ITB  and " thigh. He tolerated manual tx well with decreased tightness in all these areas.  He does have difficulty performing therapeutic exercises secondary to positioning and poor/fair transfers.  He anna benefit from seated exercises in future visits.  Pt's response to treatment:  Fair      Pain end of session:  2/10 for standing but anticipates 8/10 by the time he walks to his car.    Plan:     Continue with current POC with the following changes Add stretching and strengthening as tolerable.     Assessment of current progress against goals:  Insufficient treatment time to assess progress    Goals:  Active       PT Problem       STG, 4 visits:       Start:  02/20/24    Expected End:  04/05/24       Pt will be independent in HEP to improve LE and core strength, mobility, and function.  Pt will report 50% reduction in pain with functional mobility such as standing, walking, and stairs.         Pt will increase strength in core and LEs by 1/2 MMT in all planes for improved performance of functional mobility.        Start:  02/20/24    Expected End:  05/20/24            Pt will demonstrate no more than 25% limitation in lumbar AROM without pain in all planes for improved performance of ADLs.        Start:  02/20/24    Expected End:  05/20/24            Pt will ambulate community distances across all surfaces without deviation and without pain.       Start:  02/20/24    Expected End:  05/20/24            Pt will ascend/descend 2 flights of stairs reciprocally without deviation and without pain for improved household and community mobility.       Start:  02/20/24    Expected End:  05/20/24            Pt will tolerate >30 minutes of standing activity without pain for improved tolerance of household tasks.       Start:  02/20/24    Expected End:  05/20/24            Pt will perform all LE dressing without pain, limitation, or modification.       Start:  02/20/24    Expected End:  05/20/24

## 2024-03-04 ENCOUNTER — APPOINTMENT (OUTPATIENT)
Dept: PHYSICAL THERAPY | Facility: CLINIC | Age: 74
End: 2024-03-04
Payer: MEDICARE

## 2024-03-05 ENCOUNTER — TREATMENT (OUTPATIENT)
Dept: PHYSICAL THERAPY | Facility: CLINIC | Age: 74
End: 2024-03-05
Payer: MEDICARE

## 2024-03-05 DIAGNOSIS — M48.062 SPINAL STENOSIS, LUMBAR REGION WITH NEUROGENIC CLAUDICATION: ICD-10-CM

## 2024-03-05 PROCEDURE — 97110 THERAPEUTIC EXERCISES: CPT | Mod: GP | Performed by: PHYSICAL THERAPIST

## 2024-03-05 NOTE — PROGRESS NOTES
"Physical Therapy Treatment    Patient Name: Florentin Aguayo  MRN: [id number]  Encounter date:  3/5/2024  Time Calculation  Start Time: 0926  Stop Time: 1005  Time Calculation (min): 39 min     PT Therapeutic Procedures Time Entry  Therapeutic Exercise Time Entry: 39    Visit Number:  3 (including evaluation)  Planned total visits: 7  Visit Authorized/insurance coverage:  7      Current Problem  1. Spinal stenosis, lumbar region with neurogenic claudication  Follow Up In Physical Therapy          Surgery  2022 L3-S1 fusion.        Precautions   Precautions Comment: Hx fusion 2022, no remaining restrictions       Pain   Pain Assessment: 0-10  Pain Score: 2 (Pain at max 8/10)  Pain Type: Chronic pain, ache  Pain Location: Back  Pain Orientation: Lower      Subjective  General   Pt gets LB injections today.  No adverse response from last response, possible mild decr in pain with HEP.    Pt states his primary interest for PT is to learn machines to use for resistance training.     Relevant PMH:  Hx L TKA 2014, L3-S1 fusion 2022, thyroid disorder, diabetes, heart disease, HTN, Parkinson's      Red flags: negative    Objective  Poor/fair transfers secondary to pain.     Treatment:     TE's to incr strength and stability:  Supine:  TrA 5\" x 10  PPT 5\" 2 x 10  Iso hip add with ball and TrA  5\" 2 x 10  Hip abd with GTB and TrA 2 x 10  B sh flexion with rainbow ball with TrA x 10 and x 10 diagonals    Stand:  Cybex with staggered stance and TrA:  Rows 10# 2x 10  Sh ext 5# 2 x 10    Seated:  Pull downs 40# x 12      --------------DNP below--------------------  supine HS stretch with strap 3 x 20 sec R/L  Supine piriformis stretch with strap 3 x20 sec R/L  Seated abd bracing 5\" x20   Billed Treatment Times:  Therapeutic Exercise 14 min    Current HEP:  Supine HS stretch with strap 3 x 20 sec R/L  Supine piriformis stretch with strap 3 x20 sec R/L  Seated abd bracing 5\" x20        Manual:    MFR/S lumbar paraspinals  L piriformis " DTM/release  MFR/STM L ITB, L thigh from groin to proximal knee  Billed Treatment Times:  Manual Therapy  27 min        Assessment:     Pt's response to treatment:  mild discomfort with PPT release.  Good pace with te's after cues      Pain end of session:  2/10 walking    Plan:     Continue with current POC with the following changes consider supine hip flexor stretch     Assessment of current progress against goals:  Insufficient treatment time to assess progress    Goals:  Active       PT Problem       STG, 4 visits:       Start:  02/20/24    Expected End:  04/05/24       Pt will be independent in HEP to improve LE and core strength, mobility, and function.  Pt will report 50% reduction in pain with functional mobility such as standing, walking, and stairs.         Pt will increase strength in core and LEs by 1/2 MMT in all planes for improved performance of functional mobility.        Start:  02/20/24    Expected End:  05/20/24            Pt will demonstrate no more than 25% limitation in lumbar AROM without pain in all planes for improved performance of ADLs.        Start:  02/20/24    Expected End:  05/20/24            Pt will ambulate community distances across all surfaces without deviation and without pain.       Start:  02/20/24    Expected End:  05/20/24            Pt will ascend/descend 2 flights of stairs reciprocally without deviation and without pain for improved household and community mobility.       Start:  02/20/24    Expected End:  05/20/24            Pt will tolerate >30 minutes of standing activity without pain for improved tolerance of household tasks.       Start:  02/20/24    Expected End:  05/20/24            Pt will perform all LE dressing without pain, limitation, or modification.       Start:  02/20/24    Expected End:  05/20/24

## 2024-03-06 DIAGNOSIS — E11.9 TYPE 2 DIABETES MELLITUS WITHOUT COMPLICATION, WITHOUT LONG-TERM CURRENT USE OF INSULIN (MULTI): ICD-10-CM

## 2024-03-06 RX ORDER — BLOOD SUGAR DIAGNOSTIC
STRIP MISCELLANEOUS
Qty: 100 STRIP | Refills: 3 | Status: SHIPPED | OUTPATIENT
Start: 2024-03-06

## 2024-03-11 ENCOUNTER — TREATMENT (OUTPATIENT)
Dept: PHYSICAL THERAPY | Facility: CLINIC | Age: 74
End: 2024-03-11
Payer: MEDICARE

## 2024-03-11 DIAGNOSIS — M48.062 SPINAL STENOSIS, LUMBAR REGION WITH NEUROGENIC CLAUDICATION: ICD-10-CM

## 2024-03-11 PROCEDURE — 97110 THERAPEUTIC EXERCISES: CPT | Mod: GP,CQ

## 2024-03-11 ASSESSMENT — PAIN - FUNCTIONAL ASSESSMENT: PAIN_FUNCTIONAL_ASSESSMENT: 0-10

## 2024-03-11 ASSESSMENT — PAIN SCALES - GENERAL: PAINLEVEL_OUTOF10: 2

## 2024-03-11 NOTE — PROGRESS NOTES
"Physical Therapy Treatment    Patient Name: Florentin Aguayo  MRN: [id number]  Encounter date:  3/11/2024  Time Calculation  Start Time: 1014  Stop Time: 1100  Time Calculation (min): 46 min     PT Therapeutic Procedures Time Entry  Therapeutic Exercise Time Entry: 42    Visit Number:  4 (including evaluation)  Planned total visits: 7  Visit Authorized/insurance coverage:  7      Current Problem  1. Spinal stenosis, lumbar region with neurogenic claudication  Follow Up In Physical Therapy          Surgery  2022 L3-S1 fusion.           Precautions  Precautions  Precautions Comment: Precautions Comment: Hx fusion 2022, no remaining restrictions      Pain  Pain Assessment: 0-10  Pain Score: 2  Pain Type: Chronic pain  Pain Location: Back  Pain Orientation: Lower    Subjective  General  General Comment: Patient reports he had no increase in sx after the initiation of cybex exercises at last session.  He continues to report that pain increases when standing and decreases when sitting.         Objective  Poor/fair transfers secondary to pain. Patient performs slow supine to sit and notes fear of falling off mat.     Treatment:  Therapeutic Exercise  TE's to incr strength and stability:  Supine:  TrA 5\" x 20  PPT 5\" x10  Iso hip add with ball and TrA  5\" 2 x 10  Hip abd with rainbow ball with TrA x 10 and x 10 diagonals       Stand:  Cybex with staggered stance and TrA:  Rows 20# 2x 10  Sh ext 5# 2 x 10     Seated:  Pull downs 20# x20,   LAQs with 1.5# weights 2x10 R/L  Billed Treatment Times:  Therapeutic Exercise 42 min          --------------DNP below--------------------  supine HS stretch with strap 3 x 20 sec R/L  Supine piriformis stretch with strap 3 x20 sec R/L  Seated abd bracing 5\" x20   Billed Treatment Times:  Therapeutic Exercise 14 min     Current HEP:  Supine HS stretch with strap 3 x 20 sec R/L  Supine piriformis stretch with strap 3 x20 sec R/L  Seated abd bracing 5\" x20      Manual:    MFR/S lumbar " "paraspinals  L piriformis DTM/release  MFR/STM L ITB, L thigh from groin to proximal knee  Billed Treatment Times:  Manual Therapy  27 min      Current HEP:  Supine HS stretch with strap 3 x 20 sec R/L  Supine piriformis stretch with strap 3 x20 sec R/L  Seated abd bracing 5\" x20         Assessment:  PT Assessment  Assessment Comment: Patient's L LE is weaker than R creating some difficulty with standing exercises as well as back pain increased.  He recovered quickly when seated.  Patient is aware of using abdominal bracing with all TE.Fatigue noted at session end.   Pt's response to treatment:  Good      Pain end of session:  1/10     Plan:   Continue with current POC with the following changes:  Consider supine hip flexor stretch.      Assessment of current progress against goals:  Progressing toward functional goals    Goals:  Active       PT Problem       STG, 4 visits:       Start:  02/20/24    Expected End:  04/05/24       Pt will be independent in HEP to improve LE and core strength, mobility, and function.  Pt will report 50% reduction in pain with functional mobility such as standing, walking, and stairs.         Pt will increase strength in core and LEs by 1/2 MMT in all planes for improved performance of functional mobility.        Start:  02/20/24    Expected End:  05/20/24            Pt will demonstrate no more than 25% limitation in lumbar AROM without pain in all planes for improved performance of ADLs.        Start:  02/20/24    Expected End:  05/20/24            Pt will ambulate community distances across all surfaces without deviation and without pain.       Start:  02/20/24    Expected End:  05/20/24            Pt will ascend/descend 2 flights of stairs reciprocally without deviation and without pain for improved household and community mobility.       Start:  02/20/24    Expected End:  05/20/24            Pt will tolerate >30 minutes of standing activity without pain for improved tolerance of " household tasks.       Start:  02/20/24    Expected End:  05/20/24            Pt will perform all LE dressing without pain, limitation, or modification.       Start:  02/20/24    Expected End:  05/20/24

## 2024-03-14 DIAGNOSIS — E11.9 TYPE 2 DIABETES MELLITUS WITHOUT COMPLICATION, WITHOUT LONG-TERM CURRENT USE OF INSULIN (MULTI): ICD-10-CM

## 2024-03-14 RX ORDER — METFORMIN HYDROCHLORIDE 500 MG/1
500 TABLET ORAL 2 TIMES DAILY
Qty: 180 TABLET | Refills: 1 | Status: SHIPPED | OUTPATIENT
Start: 2024-03-14

## 2024-03-18 ENCOUNTER — TREATMENT (OUTPATIENT)
Dept: PHYSICAL THERAPY | Facility: CLINIC | Age: 74
End: 2024-03-18
Payer: MEDICARE

## 2024-03-18 DIAGNOSIS — M48.062 SPINAL STENOSIS, LUMBAR REGION WITH NEUROGENIC CLAUDICATION: ICD-10-CM

## 2024-03-18 PROCEDURE — 97110 THERAPEUTIC EXERCISES: CPT | Mod: GP,CQ

## 2024-03-18 ASSESSMENT — PAIN - FUNCTIONAL ASSESSMENT: PAIN_FUNCTIONAL_ASSESSMENT: 0-10

## 2024-03-18 ASSESSMENT — PAIN SCALES - GENERAL: PAINLEVEL_OUTOF10: 0 - NO PAIN

## 2024-03-18 NOTE — PROGRESS NOTES
"Physical Therapy Treatment    Patient Name: Florentin Aguayo  MRN: [id number]  Encounter date:  3/18/2024  Time Calculation  Start Time: 1100  Stop Time: 1148  Time Calculation (min): 48 min     PT Therapeutic Procedures Time Entry  Therapeutic Exercise Time Entry: 43    Visit Number:  5 (including evaluation)  Planned total visits: 7  Visit Authorized/insurance coverage:  7        Current Problem  1. Spinal stenosis, lumbar region with neurogenic claudication  Follow Up In Physical Therapy          Surgery  2022 L3-S1 fusion.         Precautions   Hx fusion 2022, no remaining restrictions       Pain  Pain Assessment: 0-10  Pain Score: 0 - No pain    Subjective  General  General Comment: Patient reports that his back pain has been good and that he is anxious to start working on machines as he has been doing aquatics on his own. and works on his core a lot.         Objective  Fair/good tolerance to initiation of standing exercises for strength and stability to support back    Treatment:  Therapeutic Exercise    All with TrA  3 way hip x12 R/L  HS curls x12  MIP x20  LAQs x12 1.5 #R/L    MoFlex 2 x20\"  Seated gastroc 2 x20\"    Stand:  Cybex with staggered stance and TrA:  Rows 20# 2x 10  Sh ext 5# 2 x 10  Seated:  Pull downs 20# x20,     Billed Treatment Times:  Therapeutic Exercise 42 min       Manual:  DNP  MFR/S lumbar paraspinals  L piriformis DTM/release  MFR/STM L ITB, L thigh from groin to proximal knee         Assessment:  PT Assessment  Assessment Comment: Patient is making overall progress with decreased back pain reporting that he can stand a little longer without having to sit.  He is anxious to start gym machines since he feels he is doing alright with aquatics at his local [facility], however, he needs to do these exercises on land to prepare him for machines first, which was initiated today with fair/good tolerance.  Pt's response to treatment:  Fair/good  Areas of improvements:  Uses TrA appropriately with " standing exercises   Limitations/deficits:  weakness L>R LE    Pain end of session:  1/10    Plan:     Continue with current POC/no changes    Assessment of current progress against goals:  Progressing toward functional goals    Goals:  Active       PT Problem       STG, 4 visits:       Start:  02/20/24    Expected End:  04/05/24       Pt will be independent in HEP to improve LE and core strength, mobility, and function.  Pt will report 50% reduction in pain with functional mobility such as standing, walking, and stairs.         Pt will increase strength in core and LEs by 1/2 MMT in all planes for improved performance of functional mobility.        Start:  02/20/24    Expected End:  05/20/24            Pt will demonstrate no more than 25% limitation in lumbar AROM without pain in all planes for improved performance of ADLs.        Start:  02/20/24    Expected End:  05/20/24            Pt will ambulate community distances across all surfaces without deviation and without pain.       Start:  02/20/24    Expected End:  05/20/24            Pt will ascend/descend 2 flights of stairs reciprocally without deviation and without pain for improved household and community mobility.       Start:  02/20/24    Expected End:  05/20/24            Pt will tolerate >30 minutes of standing activity without pain for improved tolerance of household tasks.       Start:  02/20/24    Expected End:  05/20/24            Pt will perform all LE dressing without pain, limitation, or modification.       Start:  02/20/24    Expected End:  05/20/24

## 2024-03-25 ENCOUNTER — TREATMENT (OUTPATIENT)
Dept: PHYSICAL THERAPY | Facility: CLINIC | Age: 74
End: 2024-03-25
Payer: MEDICARE

## 2024-03-25 DIAGNOSIS — M48.062 SPINAL STENOSIS, LUMBAR REGION WITH NEUROGENIC CLAUDICATION: ICD-10-CM

## 2024-03-25 PROCEDURE — 97110 THERAPEUTIC EXERCISES: CPT | Mod: GP,CQ

## 2024-03-25 ASSESSMENT — PAIN SCALES - GENERAL: PAINLEVEL_OUTOF10: 2

## 2024-03-25 ASSESSMENT — PAIN - FUNCTIONAL ASSESSMENT: PAIN_FUNCTIONAL_ASSESSMENT: 0-10

## 2024-03-25 NOTE — PROGRESS NOTES
"Physical Therapy Treatment    Patient Name: Florentin Aguayo  MRN: [id number]  Encounter date:  3/25/2024  Time Calculation  Start Time: 1145  Stop Time: 1230  Time Calculation (min): 45 min     PT Therapeutic Procedures Time Entry  Therapeutic Exercise Time Entry: 40    Visit Number:  6 (including evaluation)  Planned total visits: 8  Visit Authorized/insurance coverage:  [place]      Current Problem  1. Spinal stenosis, lumbar region with neurogenic claudication  Follow Up In Physical Therapy        Surgery  2022 L3-S1 fusion.     Precautions  Precautions  Precautions Comment: Precautions Comment: Hx fusion 2022, no remaining restrictions  Hx fusion 2022, no remaining restrictions     Pain  Pain Assessment: 0-10  Pain Score: 2  2/10 center lower back and in the left leg    Subjective  General   \"When I use my stomach muscles it does help take pain away.\"        Objective  Pt aware of posture and corrected with standing.     Treatment:  Therapeutic Exercise    Scifit with UE level 1, 5 min     All with TrA  - heel raises with eccentric focus x15   - hip abduction x10 L and 2x5 R -  orange tb above knees  - hip ext and flexion x 10 ea - orange tb above knees   - LAQ with x12 1# with eccentric focus   - HS curls x12 1# with eccentric focus   - MIP x20 1#     MoFlex 2 x20\" R/L  - gastroc 2 x20\"    Seated   Hamstring stretch 20\"x 2 ea     Stand: UE movements for core   Cybex with staggered stance and TrA:  -Rows 10# 2x 10  -Sh ext 10# 2 x 10    Seated: TrA   Chest press 3 sec x 10         Assessment:     Pt's response to treatment:  Added orange theraband added above knees to increase challenge to hip strengthening. Limited WB with hip abduction during SLS left.   Areas of improvements:  Uses TrA appropriately with standing exercises   Limitations/deficits:  weakness L>R LE    Pain end of session: 3-4/10    Plan:     Continue with current POC/no changes    Assessment of current progress against goals:  Progressing toward " functional goals    Goals:  Active       PT Problem       STG, 4 visits:       Start:  02/20/24    Expected End:  04/05/24       Pt will be independent in HEP to improve LE and core strength, mobility, and function.  Pt will report 50% reduction in pain with functional mobility such as standing, walking, and stairs.         Pt will increase strength in core and LEs by 1/2 MMT in all planes for improved performance of functional mobility.        Start:  02/20/24    Expected End:  05/20/24            Pt will demonstrate no more than 25% limitation in lumbar AROM without pain in all planes for improved performance of ADLs.        Start:  02/20/24    Expected End:  05/20/24            Pt will ambulate community distances across all surfaces without deviation and without pain.       Start:  02/20/24    Expected End:  05/20/24            Pt will ascend/descend 2 flights of stairs reciprocally without deviation and without pain for improved household and community mobility.       Start:  02/20/24    Expected End:  05/20/24            Pt will tolerate >30 minutes of standing activity without pain for improved tolerance of household tasks.       Start:  02/20/24    Expected End:  05/20/24            Pt will perform all LE dressing without pain, limitation, or modification.       Start:  02/20/24    Expected End:  05/20/24

## 2024-03-30 ENCOUNTER — TELEPHONE (OUTPATIENT)
Dept: PHARMACY | Facility: HOSPITAL | Age: 74
End: 2024-03-30
Payer: MEDICARE

## 2024-03-30 NOTE — TELEPHONE ENCOUNTER
Population Health: Outreach by Ambulatory Pharmacy Team    Payor: Magdalena GIBBS  Reason: Adherence  Medication: rosuvastatin 40 mg   Outcome: Left Voicemail    Patrizia Mazariegos, PharmD

## 2024-04-01 ENCOUNTER — APPOINTMENT (OUTPATIENT)
Dept: PHYSICAL THERAPY | Facility: CLINIC | Age: 74
End: 2024-04-01
Payer: MEDICARE

## 2024-04-02 ENCOUNTER — TREATMENT (OUTPATIENT)
Dept: PHYSICAL THERAPY | Facility: CLINIC | Age: 74
End: 2024-04-02
Payer: MEDICARE

## 2024-04-02 DIAGNOSIS — M48.062 SPINAL STENOSIS, LUMBAR REGION WITH NEUROGENIC CLAUDICATION: ICD-10-CM

## 2024-04-02 PROCEDURE — 97110 THERAPEUTIC EXERCISES: CPT | Mod: GP | Performed by: PHYSICAL THERAPIST

## 2024-04-02 NOTE — PROGRESS NOTES
"Physical Therapy Treatment    Patient Name: Florentin Aguayo  MRN: [id number]  Encounter date:  4/2/2024  Time Calculation  Start Time: 1315  Stop Time: 1353  Time Calculation (min): 38 min     PT Therapeutic Procedures Time Entry  Therapeutic Exercise Time Entry: 38    Visit Number:  7 (including evaluation)  Planned total visits: 8  Visit Authorized/insurance coverage:  [place]      Current Problem  1. Spinal stenosis, lumbar region with neurogenic claudication  Follow Up In Physical Therapy        Surgery  2022 L3-S1 fusion.     Precautions     Hx fusion 2022, no remaining restrictions     Pain  3/10 L thigh    Subjective  General          Objective  Pt aware of posture and corrected with standing.     Treatment:  Therapeutic Exercise    Scifit with UE level 3, 6 min     All with TrA  - heel raises with eccentric focus 2x10   - hip abduction x10 L and 2x5 R -  orange tb above knees  - hip ext and flexion x 10 ea - orange tb above knees   - LAQ with x12 1# with eccentric focus   - HS curls x12 1# with eccentric focus   - MIP x20 1#     MoFlex 2 x20\" R/L  - gastroc 2 x20\"    Seated   Hamstring stretch 20\"x 2 ea     Stand: UE movements for core   Cybex with staggered stance and TrA:  -Rows 10# 2x 10  -Sh ext 10# 2 x 10    Seated: TrA   Chest press 3 sec x 10         Assessment:     Pt's response to treatment:  rest breaks required  Areas of improvements:  Uses TrA appropriately   Limitations/deficits:  weakness L>R LE    Pain end of session: 2-3/10    Plan:     Continue with current POC/no changes    Assessment of current progress against goals:  Progressing toward functional goals    Goals:  Active       PT Problem       STG, 4 visits:       Start:  02/20/24    Expected End:  04/05/24       Pt will be independent in HEP to improve LE and core strength, mobility, and function.  Pt will report 50% reduction in pain with functional mobility such as standing, walking, and stairs.         Pt will increase strength in core and " LEs by 1/2 MMT in all planes for improved performance of functional mobility.        Start:  02/20/24    Expected End:  05/20/24            Pt will demonstrate no more than 25% limitation in lumbar AROM without pain in all planes for improved performance of ADLs.        Start:  02/20/24    Expected End:  05/20/24            Pt will ambulate community distances across all surfaces without deviation and without pain.       Start:  02/20/24    Expected End:  05/20/24            Pt will ascend/descend 2 flights of stairs reciprocally without deviation and without pain for improved household and community mobility.       Start:  02/20/24    Expected End:  05/20/24            Pt will tolerate >30 minutes of standing activity without pain for improved tolerance of household tasks.       Start:  02/20/24    Expected End:  05/20/24            Pt will perform all LE dressing without pain, limitation, or modification.       Start:  02/20/24    Expected End:  05/20/24

## 2024-04-05 DIAGNOSIS — E03.9 HYPOTHYROIDISM, UNSPECIFIED TYPE: ICD-10-CM

## 2024-04-05 RX ORDER — LEVOTHYROXINE SODIUM 150 UG/1
150 TABLET ORAL DAILY
Qty: 90 TABLET | Refills: 1 | Status: SHIPPED | OUTPATIENT
Start: 2024-04-05

## 2024-04-09 ENCOUNTER — APPOINTMENT (OUTPATIENT)
Dept: PHYSICAL THERAPY | Facility: CLINIC | Age: 74
End: 2024-04-09
Payer: MEDICARE

## 2024-04-12 ENCOUNTER — APPOINTMENT (OUTPATIENT)
Dept: PHYSICAL THERAPY | Facility: CLINIC | Age: 74
End: 2024-04-12
Payer: MEDICARE

## 2024-04-16 ENCOUNTER — TREATMENT (OUTPATIENT)
Dept: PHYSICAL THERAPY | Facility: CLINIC | Age: 74
End: 2024-04-16
Payer: MEDICARE

## 2024-04-16 DIAGNOSIS — M48.062 SPINAL STENOSIS, LUMBAR REGION WITH NEUROGENIC CLAUDICATION: ICD-10-CM

## 2024-04-16 PROCEDURE — 97110 THERAPEUTIC EXERCISES: CPT | Mod: GP

## 2024-04-16 PROCEDURE — 97750 PHYSICAL PERFORMANCE TEST: CPT | Mod: GP

## 2024-04-16 ASSESSMENT — PAIN SCALES - GENERAL: PAINLEVEL_OUTOF10: 4

## 2024-04-16 ASSESSMENT — PAIN - FUNCTIONAL ASSESSMENT: PAIN_FUNCTIONAL_ASSESSMENT: 0-10

## 2024-04-16 NOTE — PROGRESS NOTES
Physical Therapy Treatment    Patient Name: Florentin Aguayo  MRN: [id number]  Encounter date:  4/16/2024  Time Calculation  Start Time: 0950  Stop Time: 1035  Time Calculation (min): 45 min     PT Therapeutic Procedures Time Entry  Therapeutic Exercise Time Entry: 30    Visit Number:  8 (including evaluation)  Planned total visits: 8  Visits Authorized/Insurance Coverage:  MN    Current Problem  Problem List Items Addressed This Visit             ICD-10-CM    Spinal stenosis, lumbar region with neurogenic claudication M48.062    Relevant Orders    Follow Up In Physical Therapy       Surgery  2022 L3-S1 fusion.     Precautions  Precautions  Precautions Comment: Hx fusion 2022, no remaining restrictions    Pain  Pain Assessment: 0-10  Pain Score: 4    Subjective  Pt had a change of statin medication a few weeks ago and has noticed an increase in pain and soreness as a result. His back had been feeling pretty good prior to then, but now pain is present across his low back, shoulder blades, and B quads. Functional mobility was getting better until increase in pain.     Objective  LE MMT L R   Hip flexion 4+/5 4+/5   Hip extension 4/5 4/5   Hip abduction 4+/5 4+/5   Hip adduction 4+4/5 4+/5   Knee flexion 5/5 5/5   Knee extension 5/5 5/5     Lumbar ROM Range   Flexion 25% limited with some pain   Extension 25% limited   R rotation 25% limited   L rotation 25% limited   R sidebend 25% limited   L sidebend 25% limited     Gait: limited to ~5 minutes at a time unless he uses walking sticks. Mild flexed posture, shortened step length B, increased time in double stance (more pronounced today due to recent flare up  Stairs: with single rail, performs reciprocally. Increased use of UEs for ascent soto when pushing through RLE with increase in R thigh pain. Descends with normal, controlled pattern   Standing tolerance: ~5 minutes before needing rest    Treatment:  Therapeutic Exercise  Therapeutic Exercise Performed:  "Yes  Exercise today focused on stretching and gentle strengthening due to generalized pain and soreness  NuStep L1 x 4'    Supine:  Assisted hamstring stretch 3x30\" ea  Assisted piriformis stretch 3x30\" ea  DKTC green ball x20    Seated on dynadisc, holding purple ball to challenge core stability:  March 2x10  LAQ 2x10      Assessment:  Pt's response to treatment:  Pt with good response to intervention thus far with better ROM and better LE strength. Pain levels were down overall however pt had recent flare up possibly related to medication change. Pt remains with deficits in standing, walking, and stairs. Pt is doing well with aquatic exercise and HEP but has not yet progressed to gym-based exercise which was his goal. Pt has two visits remaining on POC, plan to use those two to promote independent strengthening with gym routine and discharge following.     Pain end of session: No change    Plan:  OP PT Plan  Treatment/Interventions: Education/ Instruction, Electrical stimulation, Manual therapy, Neuromuscular re-education, Taping techniques, Therapeutic activities, Therapeutic exercises, Ultrasound  PT Plan: Skilled PT  PT Frequency: 1 time per week  Duration: 2 additional visits, 10 total  Certification Period Start Date: 04/16/24  Certification Period End Date: 05/31/24  Number of Treatments Authorized: MN  Rehab Potential: Good  Plan of Care Agreement: Patient  Continue PT 1x/wk for 2 weeks, 10 total visits to promote independent exercise routine    Assessment of current progress against goals:  Progressing toward functional goals    Goals:  Active       PT Problem       STG, 4 visits: (Met)       Start:  02/20/24    Expected End:  04/05/24    Resolved:  04/16/24    Pt will be independent in HEP to improve LE and core strength, mobility, and function.  Pt will report 50% reduction in pain with functional mobility such as standing, walking, and stairs.         Pt will increase strength in core and LEs by 1/2 MMT " in all planes for improved performance of functional mobility.  (Progressing)       Start:  02/20/24    Expected End:  05/20/24            Pt will demonstrate no more than 25% limitation in lumbar AROM without pain in all planes for improved performance of ADLs.  (Progressing)       Start:  02/20/24    Expected End:  05/20/24            Pt will ambulate community distances across all surfaces without deviation and without pain. (Progressing)       Start:  02/20/24    Expected End:  05/20/24            Pt will ascend/descend 2 flights of stairs reciprocally without deviation and without pain for improved household and community mobility. (Progressing)       Start:  02/20/24    Expected End:  05/20/24            Pt will tolerate >30 minutes of standing activity without pain for improved tolerance of household tasks. (Progressing)       Start:  02/20/24    Expected End:  05/20/24            Pt will perform all LE dressing without pain, limitation, or modification. (Progressing)       Start:  02/20/24    Expected End:  05/20/24

## 2024-04-23 ENCOUNTER — APPOINTMENT (OUTPATIENT)
Dept: PHYSICAL THERAPY | Facility: CLINIC | Age: 74
End: 2024-04-23
Payer: MEDICARE

## 2024-05-20 ENCOUNTER — DOCUMENTATION (OUTPATIENT)
Dept: PHYSICAL THERAPY | Facility: CLINIC | Age: 74
End: 2024-05-20
Payer: MEDICARE

## 2024-05-20 NOTE — PROGRESS NOTES
Discharge Summary    Name: Florentin Aguayo  MRN: [id number]  : 1950  Date: 24    Discharge Summary: PT    Discharge Information: Date of discharge 24 and Date of last visit 24    Therapy Summary: Pt not to therapy since above date. Pt canceled/did not attend any remaining sessions.    Rehab Discharge Reason: Failed to schedule and/or keep follow-up appointment(s)

## 2024-06-19 ENCOUNTER — LAB (OUTPATIENT)
Dept: LAB | Facility: LAB | Age: 74
End: 2024-06-19
Payer: MEDICARE

## 2024-06-19 DIAGNOSIS — E03.9 HYPOTHYROIDISM, UNSPECIFIED TYPE: ICD-10-CM

## 2024-06-19 DIAGNOSIS — E78.00 PURE HYPERCHOLESTEROLEMIA, UNSPECIFIED: Primary | ICD-10-CM

## 2024-06-19 DIAGNOSIS — G20.A1 PARKINSON'S DISEASE WITHOUT DYSKINESIA OR FLUCTUATING MANIFESTATIONS (MULTI): ICD-10-CM

## 2024-06-19 LAB
ALT SERPL-CCNC: 29 U/L (ref 5–40)
AST SERPL-CCNC: 27 U/L (ref 5–40)
CHOLEST SERPL-MCNC: 108 MG/DL (ref 133–200)
CHOLEST/HDLC SERPL: 2.8 {RATIO}
HDLC SERPL-MCNC: 39 MG/DL
LDLC SERPL CALC-MCNC: 27 MG/DL (ref 65–130)
T4 FREE SERPL-MCNC: 1 NG/DL (ref 0.9–1.7)
TRIGL SERPL-MCNC: 208 MG/DL (ref 40–150)
TSH SERPL DL<=0.05 MIU/L-ACNC: 8.26 MIU/L (ref 0.27–4.2)

## 2024-06-19 PROCEDURE — 84450 TRANSFERASE (AST) (SGOT): CPT

## 2024-06-19 PROCEDURE — 84443 ASSAY THYROID STIM HORMONE: CPT

## 2024-06-19 PROCEDURE — 84460 ALANINE AMINO (ALT) (SGPT): CPT

## 2024-06-19 PROCEDURE — 36415 COLL VENOUS BLD VENIPUNCTURE: CPT

## 2024-06-19 PROCEDURE — 84439 ASSAY OF FREE THYROXINE: CPT

## 2024-06-19 PROCEDURE — 80061 LIPID PANEL: CPT

## 2024-06-19 RX ORDER — CARBIDOPA AND LEVODOPA 25; 100 MG/1; MG/1
TABLET ORAL
Qty: 270 TABLET | Refills: 2 | Status: SHIPPED | OUTPATIENT
Start: 2024-06-19

## 2024-06-24 ENCOUNTER — TELEPHONE (OUTPATIENT)
Dept: PRIMARY CARE | Facility: CLINIC | Age: 74
End: 2024-06-24

## 2024-06-24 ENCOUNTER — APPOINTMENT (OUTPATIENT)
Dept: PRIMARY CARE | Facility: CLINIC | Age: 74
End: 2024-06-24
Payer: MEDICARE

## 2024-06-24 VITALS
DIASTOLIC BLOOD PRESSURE: 66 MMHG | OXYGEN SATURATION: 94 % | BODY MASS INDEX: 40.03 KG/M2 | SYSTOLIC BLOOD PRESSURE: 118 MMHG | WEIGHT: 279 LBS | HEART RATE: 51 BPM | RESPIRATION RATE: 18 BRPM

## 2024-06-24 DIAGNOSIS — Z79.899 MEDICATION MANAGEMENT: ICD-10-CM

## 2024-06-24 DIAGNOSIS — E03.9 HYPOTHYROIDISM, UNSPECIFIED TYPE: Primary | ICD-10-CM

## 2024-06-24 DIAGNOSIS — E78.00 HYPERCHOLESTEROLEMIA: ICD-10-CM

## 2024-06-24 DIAGNOSIS — I10 BENIGN ESSENTIAL HYPERTENSION: ICD-10-CM

## 2024-06-24 DIAGNOSIS — E03.9 HYPOTHYROIDISM, UNSPECIFIED TYPE: ICD-10-CM

## 2024-06-24 DIAGNOSIS — E11.9 TYPE 2 DIABETES MELLITUS WITHOUT COMPLICATION, WITHOUT LONG-TERM CURRENT USE OF INSULIN (MULTI): ICD-10-CM

## 2024-06-24 DIAGNOSIS — I25.10 ARTERIOSCLEROSIS OF CORONARY ARTERY: ICD-10-CM

## 2024-06-24 PROCEDURE — 3074F SYST BP LT 130 MM HG: CPT | Performed by: FAMILY MEDICINE

## 2024-06-24 PROCEDURE — 4010F ACE/ARB THERAPY RXD/TAKEN: CPT | Performed by: FAMILY MEDICINE

## 2024-06-24 PROCEDURE — 1159F MED LIST DOCD IN RCRD: CPT | Performed by: FAMILY MEDICINE

## 2024-06-24 PROCEDURE — 3078F DIAST BP <80 MM HG: CPT | Performed by: FAMILY MEDICINE

## 2024-06-24 PROCEDURE — 1157F ADVNC CARE PLAN IN RCRD: CPT | Performed by: FAMILY MEDICINE

## 2024-06-24 PROCEDURE — 3048F LDL-C <100 MG/DL: CPT | Performed by: FAMILY MEDICINE

## 2024-06-24 PROCEDURE — 3051F HG A1C>EQUAL 7.0%<8.0%: CPT | Performed by: FAMILY MEDICINE

## 2024-06-24 PROCEDURE — 99214 OFFICE O/P EST MOD 30 MIN: CPT | Performed by: FAMILY MEDICINE

## 2024-06-24 PROCEDURE — 1036F TOBACCO NON-USER: CPT | Performed by: FAMILY MEDICINE

## 2024-06-24 PROCEDURE — 1125F AMNT PAIN NOTED PAIN PRSNT: CPT | Performed by: FAMILY MEDICINE

## 2024-06-24 RX ORDER — EVOLOCUMAB 140 MG/ML
INJECTION, SOLUTION SUBCUTANEOUS
COMMUNITY

## 2024-06-24 RX ORDER — MELOXICAM 7.5 MG/1
TABLET ORAL
COMMUNITY
Start: 2024-06-19

## 2024-06-24 RX ORDER — LOSARTAN POTASSIUM 25 MG/1
1 TABLET ORAL
COMMUNITY
Start: 2024-06-03

## 2024-06-24 ASSESSMENT — PATIENT HEALTH QUESTIONNAIRE - PHQ9
2. FEELING DOWN, DEPRESSED OR HOPELESS: NOT AT ALL
1. LITTLE INTEREST OR PLEASURE IN DOING THINGS: NOT AT ALL
SUM OF ALL RESPONSES TO PHQ9 QUESTIONS 1 AND 2: 0

## 2024-06-24 ASSESSMENT — PAIN SCALES - GENERAL: PAINLEVEL: 2

## 2024-06-24 ASSESSMENT — ENCOUNTER SYMPTOMS
OCCASIONAL FEELINGS OF UNSTEADINESS: 0
LOSS OF SENSATION IN FEET: 0
DEPRESSION: 0

## 2024-06-24 NOTE — ASSESSMENT & PLAN NOTE
Continue Repatha and following with Dr. Hahn will check lipid panel again in 6 months at [facility].

## 2024-06-24 NOTE — PROGRESS NOTES
Subjective   Patient ID: Florentin Aguayo is a 73 y.o. male who presents for Results (Patient is here to go over his blood work results).    HPI   1. FLORENTIN is seen today  for follow up of High cholesterol.  He is on Repatha through Dr. Hahn after failing  atorvastatin and Crestor. Recent LDL is 27.     2. FLORENTIN is here also for follow up of benign essential hypertension.  He is on Toprol, Altace and chorthalidone by Dr. Hahn.     3. FLORENTIN is seen also for follow up of Hypothyroidism.  He is on levothyroxine 175  mcg.   recent TSH is Elevated with a normal thyroxine.     4. FLORENTIN is seen for also complaining of for GERD.  He is on Protonix.     5. FLORENTIN is seen today for follow-up of coronary artery disease.  He is S/P stent placement by Dr. Hahn.     6. FLORENTIN is seen also for follow up of Diabetes 2, non insulin requiring.  He is on metformin 500 mg BID . He is following a ADA diet. Recent blood work did not include A1c.      7.   He is also here for follow up of sleep apnea.  He was using CPAP machine but has found it to be too difficult to wherein he stopped using it.      8.   He  is also here for follow-up of anemia.  Gastrointestinal workup in 10/17 showed mild gastritis and colitis. Recent blood work did not include a CBC.  Review of Systems  Denies headache, blurred vision, chest pain, shortness of breath, nausea or vomiting, change in bowel habits or leg pain or swelling.    Objective   /66 (BP Location: Left arm, Patient Position: Sitting, BP Cuff Size: Large adult)   Pulse 51   Resp 18   Wt 127 kg (279 lb)   SpO2 94%   BMI 40.03 kg/m²     Physical Exam  Vitals and nurse's notes reviewed  General: no acute distress  HEENT: Normal  Neck: Supple  Lungs: Clear  Cardio: RRR w/o murmur  Extremities: No edema, no calf tenderness  Neuro: Alert and oriented with no focal deficits    Assessment/Plan   Problem List Items Addressed This Visit             ICD-10-CM       High    Arteriosclerosis  of coronary artery I25.10    Benign essential hypertension I10     Continue Toprol, Altace and chlorthalidone by Dr. Hahn.  Recheck here in 6 months.         Hypothyroidism - Primary E03.9     Continue current dose of levothyroxine.  Will recheck in 6 months at CPE.         Hypercholesterolemia E78.00     Continue Repatha and following with Dr. Hahn will check lipid panel again in 6 months at CPE.            Medium    Diabetes (Multi) E11.9    Relevant Orders    Hemoglobin A1c    Basic metabolic panel

## 2024-07-01 ENCOUNTER — LAB (OUTPATIENT)
Dept: LAB | Facility: LAB | Age: 74
End: 2024-07-01
Payer: MEDICARE

## 2024-07-01 DIAGNOSIS — E11.9 TYPE 2 DIABETES MELLITUS WITHOUT COMPLICATION, WITHOUT LONG-TERM CURRENT USE OF INSULIN (MULTI): ICD-10-CM

## 2024-07-01 LAB
ANION GAP SERPL CALC-SCNC: 12 MMOL/L
BUN SERPL-MCNC: 19 MG/DL (ref 8–25)
CALCIUM SERPL-MCNC: 9.3 MG/DL (ref 8.5–10.4)
CHLORIDE SERPL-SCNC: 102 MMOL/L (ref 97–107)
CO2 SERPL-SCNC: 26 MMOL/L (ref 24–31)
CREAT SERPL-MCNC: 1 MG/DL (ref 0.4–1.6)
EGFRCR SERPLBLD CKD-EPI 2021: 79 ML/MIN/1.73M*2
EST. AVERAGE GLUCOSE BLD GHB EST-MCNC: 154 MG/DL
GLUCOSE SERPL-MCNC: 260 MG/DL (ref 65–99)
HBA1C MFR BLD: 7 %
POTASSIUM SERPL-SCNC: 3.9 MMOL/L (ref 3.4–5.1)
SODIUM SERPL-SCNC: 140 MMOL/L (ref 133–145)

## 2024-07-01 PROCEDURE — 36415 COLL VENOUS BLD VENIPUNCTURE: CPT

## 2024-07-01 PROCEDURE — 83036 HEMOGLOBIN GLYCOSYLATED A1C: CPT

## 2024-07-01 PROCEDURE — 80048 BASIC METABOLIC PNL TOTAL CA: CPT

## 2024-08-03 DIAGNOSIS — K21.9 GASTROESOPHAGEAL REFLUX DISEASE WITHOUT ESOPHAGITIS: ICD-10-CM

## 2024-08-03 DIAGNOSIS — E11.9 TYPE 2 DIABETES MELLITUS WITHOUT COMPLICATION, WITHOUT LONG-TERM CURRENT USE OF INSULIN (MULTI): ICD-10-CM

## 2024-08-05 RX ORDER — METFORMIN HYDROCHLORIDE 500 MG/1
500 TABLET ORAL 2 TIMES DAILY
Qty: 180 TABLET | Refills: 1 | Status: SHIPPED | OUTPATIENT
Start: 2024-08-05

## 2024-08-05 RX ORDER — PANTOPRAZOLE SODIUM 40 MG/1
40 TABLET, DELAYED RELEASE ORAL DAILY
Qty: 90 TABLET | Refills: 1 | Status: SHIPPED | OUTPATIENT
Start: 2024-08-05

## 2024-08-07 DIAGNOSIS — E11.9 TYPE 2 DIABETES MELLITUS WITHOUT COMPLICATION, WITHOUT LONG-TERM CURRENT USE OF INSULIN (MULTI): ICD-10-CM

## 2024-08-07 DIAGNOSIS — K21.9 GASTROESOPHAGEAL REFLUX DISEASE WITHOUT ESOPHAGITIS: ICD-10-CM

## 2024-08-07 RX ORDER — METFORMIN HYDROCHLORIDE 500 MG/1
500 TABLET ORAL 2 TIMES DAILY
Qty: 180 TABLET | Refills: 1 | OUTPATIENT
Start: 2024-08-07

## 2024-08-07 RX ORDER — PANTOPRAZOLE SODIUM 40 MG/1
40 TABLET, DELAYED RELEASE ORAL DAILY
Qty: 90 TABLET | Refills: 1 | OUTPATIENT
Start: 2024-08-07

## 2024-08-27 DIAGNOSIS — K21.9 GASTROESOPHAGEAL REFLUX DISEASE WITHOUT ESOPHAGITIS: ICD-10-CM

## 2024-08-27 RX ORDER — PANTOPRAZOLE SODIUM 40 MG/1
40 TABLET, DELAYED RELEASE ORAL DAILY
Qty: 90 TABLET | Refills: 1 | Status: SHIPPED | OUTPATIENT
Start: 2024-08-27

## 2024-09-27 DIAGNOSIS — E11.9 TYPE 2 DIABETES MELLITUS WITHOUT COMPLICATION, WITHOUT LONG-TERM CURRENT USE OF INSULIN (MULTI): ICD-10-CM

## 2024-09-27 RX ORDER — METFORMIN HYDROCHLORIDE 500 MG/1
500 TABLET ORAL 2 TIMES DAILY
Qty: 180 TABLET | Refills: 0 | Status: SHIPPED | OUTPATIENT
Start: 2024-09-27

## 2024-10-14 ENCOUNTER — TELEPHONE (OUTPATIENT)
Dept: PRIMARY CARE | Facility: CLINIC | Age: 74
End: 2024-10-14
Payer: MEDICARE

## 2024-10-14 DIAGNOSIS — E03.9 HYPOTHYROIDISM, UNSPECIFIED TYPE: ICD-10-CM

## 2024-10-14 DIAGNOSIS — I10 BENIGN ESSENTIAL HYPERTENSION: ICD-10-CM

## 2024-10-14 RX ORDER — LEVOTHYROXINE SODIUM 150 UG/1
150 TABLET ORAL DAILY
Qty: 90 TABLET | Refills: 1 | Status: SHIPPED | OUTPATIENT
Start: 2024-10-14

## 2024-10-14 NOTE — TELEPHONE ENCOUNTER
Patient looking for cardiologist referral since Dr Hahn retired.  A stent was placed in his heart 20 years ago. His number is [phone]

## 2024-10-15 DIAGNOSIS — I10 BENIGN ESSENTIAL HYPERTENSION: ICD-10-CM

## 2024-10-15 DIAGNOSIS — E78.00 HYPERCHOLESTEROLEMIA: ICD-10-CM

## 2024-10-15 NOTE — TELEPHONE ENCOUNTER
Please send the attached prescription to the patient's pharmacy as soon as possible. Thank you!    Requested Prescriptions     Pending Prescriptions Disp Refills    chlorthalidone (Hygroton) 25 mg tablet 90 tablet 3     Sig: Take 1 tablet (25 mg) by mouth once daily.    Repatha SureClick 140 mg/mL injection 6 mL 3     Sig: Inject 1 mL (140 mg) under the skin every 14 (fourteen) days.     Prev Hahn patient seeing you in mid November

## 2024-10-16 RX ORDER — CHLORTHALIDONE 25 MG/1
25 TABLET ORAL DAILY
Qty: 90 TABLET | Refills: 3 | Status: SHIPPED | OUTPATIENT
Start: 2024-10-16 | End: 2025-10-11

## 2024-10-16 RX ORDER — EVOLOCUMAB 140 MG/ML
140 INJECTION, SOLUTION SUBCUTANEOUS
Qty: 6 ML | Refills: 3 | Status: SHIPPED | OUTPATIENT
Start: 2024-10-16 | End: 2025-10-11

## 2024-10-22 DIAGNOSIS — I10 BENIGN ESSENTIAL HYPERTENSION: ICD-10-CM

## 2024-10-22 RX ORDER — CHLORTHALIDONE 25 MG/1
25 TABLET ORAL DAILY
Qty: 90 TABLET | Refills: 1 | Status: SHIPPED | OUTPATIENT
Start: 2024-10-22 | End: 2025-04-20

## 2024-10-22 NOTE — TELEPHONE ENCOUNTER
Previous Hahn pt. Please send the attached prescription to the patient's pharmacy as soon as possible. Thank you!    Requested Prescriptions     Pending Prescriptions Disp Refills    chlorthalidone (Hygroton) 25 mg tablet 90 tablet 1     Sig: Take 1 tablet (25 mg) by mouth once daily.

## 2024-10-28 ENCOUNTER — APPOINTMENT (OUTPATIENT)
Dept: RADIOLOGY | Facility: HOSPITAL | Age: 74
End: 2024-10-28
Payer: MEDICARE

## 2024-10-28 ENCOUNTER — HOSPITAL ENCOUNTER (OUTPATIENT)
Dept: RADIOLOGY | Facility: HOSPITAL | Age: 74
Discharge: HOME | End: 2024-10-28
Payer: MEDICARE

## 2024-10-28 DIAGNOSIS — M48.062 SPINAL STENOSIS, LUMBAR REGION WITH NEUROGENIC CLAUDICATION: ICD-10-CM

## 2024-10-28 PROCEDURE — 72148 MRI LUMBAR SPINE W/O DYE: CPT

## 2024-10-28 PROCEDURE — 72148 MRI LUMBAR SPINE W/O DYE: CPT | Performed by: RADIOLOGY

## 2024-11-12 ENCOUNTER — APPOINTMENT (OUTPATIENT)
Dept: CARDIOLOGY | Facility: CLINIC | Age: 74
End: 2024-11-12
Payer: MEDICARE

## 2024-11-12 VITALS
RESPIRATION RATE: 16 BRPM | DIASTOLIC BLOOD PRESSURE: 60 MMHG | HEART RATE: 61 BPM | TEMPERATURE: 98.6 F | WEIGHT: 282 LBS | BODY MASS INDEX: 39.48 KG/M2 | HEIGHT: 71 IN | SYSTOLIC BLOOD PRESSURE: 123 MMHG | OXYGEN SATURATION: 98 %

## 2024-11-12 DIAGNOSIS — I25.10 ARTERIOSCLEROSIS OF CORONARY ARTERY: Primary | ICD-10-CM

## 2024-11-12 DIAGNOSIS — I10 BENIGN ESSENTIAL HYPERTENSION: ICD-10-CM

## 2024-11-12 DIAGNOSIS — E78.2 MIXED HYPERLIPIDEMIA: ICD-10-CM

## 2024-11-12 DIAGNOSIS — G47.31 CENTRAL SLEEP APNEA SYNDROME: ICD-10-CM

## 2024-11-12 DIAGNOSIS — G20.A2 PARKINSON'S DISEASE WITHOUT DYSKINESIA, WITH FLUCTUATING MANIFESTATIONS: ICD-10-CM

## 2024-11-12 DIAGNOSIS — Z86.39 HISTORY OF DIABETES MELLITUS: ICD-10-CM

## 2024-11-12 DIAGNOSIS — Z86.79 HISTORY OF HYPERTENSION: ICD-10-CM

## 2024-11-12 PROCEDURE — 4010F ACE/ARB THERAPY RXD/TAKEN: CPT | Performed by: INTERNAL MEDICINE

## 2024-11-12 PROCEDURE — 3048F LDL-C <100 MG/DL: CPT | Performed by: INTERNAL MEDICINE

## 2024-11-12 PROCEDURE — 3074F SYST BP LT 130 MM HG: CPT | Performed by: INTERNAL MEDICINE

## 2024-11-12 PROCEDURE — 1036F TOBACCO NON-USER: CPT | Performed by: INTERNAL MEDICINE

## 2024-11-12 PROCEDURE — 93000 ELECTROCARDIOGRAM COMPLETE: CPT | Performed by: INTERNAL MEDICINE

## 2024-11-12 PROCEDURE — 3051F HG A1C>EQUAL 7.0%<8.0%: CPT | Performed by: INTERNAL MEDICINE

## 2024-11-12 PROCEDURE — 99204 OFFICE O/P NEW MOD 45 MIN: CPT | Performed by: INTERNAL MEDICINE

## 2024-11-12 PROCEDURE — 1160F RVW MEDS BY RX/DR IN RCRD: CPT | Performed by: INTERNAL MEDICINE

## 2024-11-12 PROCEDURE — 1126F AMNT PAIN NOTED NONE PRSNT: CPT | Performed by: INTERNAL MEDICINE

## 2024-11-12 PROCEDURE — 3078F DIAST BP <80 MM HG: CPT | Performed by: INTERNAL MEDICINE

## 2024-11-12 PROCEDURE — 1157F ADVNC CARE PLAN IN RCRD: CPT | Performed by: INTERNAL MEDICINE

## 2024-11-12 PROCEDURE — 1159F MED LIST DOCD IN RCRD: CPT | Performed by: INTERNAL MEDICINE

## 2024-11-12 RX ORDER — PREGABALIN 75 MG/1
75 CAPSULE ORAL 2 TIMES DAILY
COMMUNITY

## 2024-11-12 RX ORDER — LOSARTAN POTASSIUM 25 MG/1
25 TABLET ORAL
Qty: 90 TABLET | Refills: 3 | Status: SHIPPED | OUTPATIENT
Start: 2024-11-12 | End: 2025-11-12

## 2024-11-12 RX ORDER — METOPROLOL SUCCINATE 50 MG/1
50 TABLET, EXTENDED RELEASE ORAL DAILY
Qty: 90 TABLET | Refills: 3 | Status: SHIPPED | OUTPATIENT
Start: 2024-11-12 | End: 2025-11-12

## 2024-11-12 ASSESSMENT — LIFESTYLE VARIABLES
SKIP TO QUESTIONS 9-10: 1
AUDIT-C TOTAL SCORE: 1
HOW OFTEN DO YOU HAVE A DRINK CONTAINING ALCOHOL: MONTHLY OR LESS
HOW MANY STANDARD DRINKS CONTAINING ALCOHOL DO YOU HAVE ON A TYPICAL DAY: 1 OR 2
AUDIT TOTAL SCORE: 1
HAS A RELATIVE, FRIEND, DOCTOR, OR ANOTHER HEALTH PROFESSIONAL EXPRESSED CONCERN ABOUT YOUR DRINKING OR SUGGESTED YOU CUT DOWN: NO
HAVE YOU OR SOMEONE ELSE BEEN INJURED AS A RESULT OF YOUR DRINKING: NO
HOW OFTEN DO YOU HAVE SIX OR MORE DRINKS ON ONE OCCASION: NEVER

## 2024-11-12 ASSESSMENT — ENCOUNTER SYMPTOMS
OCCASIONAL FEELINGS OF UNSTEADINESS: 0
LOSS OF SENSATION IN FEET: 0
DEPRESSION: 0

## 2024-11-12 ASSESSMENT — PATIENT HEALTH QUESTIONNAIRE - PHQ9
2. FEELING DOWN, DEPRESSED OR HOPELESS: NOT AT ALL
SUM OF ALL RESPONSES TO PHQ9 QUESTIONS 1 AND 2: 0
1. LITTLE INTEREST OR PLEASURE IN DOING THINGS: NOT AT ALL

## 2024-11-12 ASSESSMENT — PAIN SCALES - GENERAL: PAINLEVEL_OUTOF10: 0-NO PAIN
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
## 2024-11-20 ENCOUNTER — TELEPHONE (OUTPATIENT)
Dept: PRIMARY CARE | Facility: CLINIC | Age: 74
End: 2024-11-20
Payer: MEDICARE

## 2024-11-20 DIAGNOSIS — G20.A2 PARKINSON'S DISEASE WITHOUT DYSKINESIA, WITH FLUCTUATING MANIFESTATIONS: ICD-10-CM

## 2024-11-20 NOTE — TELEPHONE ENCOUNTER
Please let patient know that I think it would be worthwhile following through with their recommendation.  Okay to refer to ENT.

## 2024-11-20 NOTE — TELEPHONE ENCOUNTER
JYOTI referred patient to neurology for speech due to his parkinson's . The neurologist that patient saw referred patient to speech therapist. The speech therapist that patient saw does not think his speech problem is related to him having parkinson's, so she suggested patient sees an ENT doctor. Does JYOTI agree with this ? If so can a referral be placed for an ENT ?

## 2024-12-02 DIAGNOSIS — K21.9 GASTROESOPHAGEAL REFLUX DISEASE WITHOUT ESOPHAGITIS: ICD-10-CM

## 2024-12-02 RX ORDER — PANTOPRAZOLE SODIUM 40 MG/1
40 TABLET, DELAYED RELEASE ORAL DAILY
Qty: 90 TABLET | Refills: 1 | Status: SHIPPED | OUTPATIENT
Start: 2024-12-02

## 2024-12-10 ENCOUNTER — APPOINTMENT (OUTPATIENT)
Dept: OTOLARYNGOLOGY | Facility: CLINIC | Age: 74
End: 2024-12-10
Payer: MEDICARE

## 2024-12-10 VITALS
WEIGHT: 286.2 LBS | TEMPERATURE: 97.3 F | SYSTOLIC BLOOD PRESSURE: 130 MMHG | HEIGHT: 71 IN | BODY MASS INDEX: 40.07 KG/M2 | DIASTOLIC BLOOD PRESSURE: 84 MMHG

## 2024-12-10 DIAGNOSIS — K21.9 LARYNGOPHARYNGEAL REFLUX (LPR): Primary | ICD-10-CM

## 2024-12-10 DIAGNOSIS — R49.0 HOARSENESS: ICD-10-CM

## 2024-12-10 PROCEDURE — 99214 OFFICE O/P EST MOD 30 MIN: CPT | Performed by: OTOLARYNGOLOGY

## 2024-12-10 PROCEDURE — 1036F TOBACCO NON-USER: CPT | Performed by: OTOLARYNGOLOGY

## 2024-12-10 PROCEDURE — 3048F LDL-C <100 MG/DL: CPT | Performed by: OTOLARYNGOLOGY

## 2024-12-10 PROCEDURE — 3051F HG A1C>EQUAL 7.0%<8.0%: CPT | Performed by: OTOLARYNGOLOGY

## 2024-12-10 PROCEDURE — 1159F MED LIST DOCD IN RCRD: CPT | Performed by: OTOLARYNGOLOGY

## 2024-12-10 PROCEDURE — 3079F DIAST BP 80-89 MM HG: CPT | Performed by: OTOLARYNGOLOGY

## 2024-12-10 PROCEDURE — 1157F ADVNC CARE PLAN IN RCRD: CPT | Performed by: OTOLARYNGOLOGY

## 2024-12-10 PROCEDURE — 4010F ACE/ARB THERAPY RXD/TAKEN: CPT | Performed by: OTOLARYNGOLOGY

## 2024-12-10 PROCEDURE — 1160F RVW MEDS BY RX/DR IN RCRD: CPT | Performed by: OTOLARYNGOLOGY

## 2024-12-10 PROCEDURE — 3075F SYST BP GE 130 - 139MM HG: CPT | Performed by: OTOLARYNGOLOGY

## 2024-12-10 PROCEDURE — 31575 DIAGNOSTIC LARYNGOSCOPY: CPT | Performed by: OTOLARYNGOLOGY
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
## 2024-12-20 DIAGNOSIS — E11.9 TYPE 2 DIABETES MELLITUS WITHOUT COMPLICATION, WITHOUT LONG-TERM CURRENT USE OF INSULIN (MULTI): ICD-10-CM

## 2024-12-20 RX ORDER — METFORMIN HYDROCHLORIDE 500 MG/1
500 TABLET ORAL 2 TIMES DAILY
Qty: 180 TABLET | Refills: 0 | Status: SHIPPED | OUTPATIENT
Start: 2024-12-20

## 2025-01-08 ENCOUNTER — TELEPHONE (OUTPATIENT)
Dept: PRIMARY CARE | Facility: CLINIC | Age: 75
End: 2025-01-08
Payer: MEDICARE

## 2025-01-08 DIAGNOSIS — K21.9 GASTROESOPHAGEAL REFLUX DISEASE WITHOUT ESOPHAGITIS: ICD-10-CM

## 2025-01-08 DIAGNOSIS — E11.9 TYPE 2 DIABETES MELLITUS WITHOUT COMPLICATION, WITHOUT LONG-TERM CURRENT USE OF INSULIN (MULTI): ICD-10-CM

## 2025-01-08 DIAGNOSIS — E78.00 HYPERCHOLESTEROLEMIA: ICD-10-CM

## 2025-01-08 DIAGNOSIS — G47.31 CENTRAL SLEEP APNEA SYNDROME: ICD-10-CM

## 2025-01-08 DIAGNOSIS — G20.A2 PARKINSON'S DISEASE WITHOUT DYSKINESIA, WITH FLUCTUATING MANIFESTATIONS: ICD-10-CM

## 2025-01-08 DIAGNOSIS — Z86.79 HISTORY OF HYPERTENSION: ICD-10-CM

## 2025-01-08 DIAGNOSIS — I25.10 ARTERIOSCLEROSIS OF CORONARY ARTERY: ICD-10-CM

## 2025-01-08 DIAGNOSIS — I10 BENIGN ESSENTIAL HYPERTENSION: ICD-10-CM

## 2025-01-08 DIAGNOSIS — E78.2 MIXED HYPERLIPIDEMIA: ICD-10-CM

## 2025-01-08 DIAGNOSIS — E03.9 HYPOTHYROIDISM, UNSPECIFIED TYPE: ICD-10-CM

## 2025-01-08 DIAGNOSIS — Z86.39 HISTORY OF DIABETES MELLITUS: ICD-10-CM

## 2025-01-08 RX ORDER — METFORMIN HYDROCHLORIDE 500 MG/1
500 TABLET ORAL 2 TIMES DAILY
Qty: 180 TABLET | Refills: 1 | Status: SHIPPED | OUTPATIENT
Start: 2025-01-08

## 2025-01-08 RX ORDER — PANTOPRAZOLE SODIUM 40 MG/1
40 TABLET, DELAYED RELEASE ORAL DAILY
Qty: 90 TABLET | Refills: 1 | Status: SHIPPED | OUTPATIENT
Start: 2025-01-08

## 2025-01-08 RX ORDER — LEVOTHYROXINE SODIUM 150 UG/1
150 TABLET ORAL DAILY
Qty: 90 TABLET | Refills: 1 | Status: SHIPPED | OUTPATIENT
Start: 2025-01-08

## 2025-01-08 NOTE — TELEPHONE ENCOUNTER
Rx Refill Req-90 days                   Pharmacy-  [facility] #[id number] - [address] Phone: [phone]   Fax: [phone]          Medication-   Dispensed Days Supply Quantity Provider Pharmacy   CHLORTHALIDONE 25 MG TABLET 10/22/2024 90 90 each Vinny Garcias MD [facility]/pharmacy #[id number] - P...      Dispensed Days Supply Quantity Provider Pharmacy   METOPROLOL SUCC ER 50 MG TAB 12/07/2024 90 90 each Vinny Garcias MD [facility]/pharmacy #[id number] - P...      Dispensed Days Supply Quantity Provider Pharmacy   LOSARTAN POTASSIUM 25 MG TAB 11/29/2024 90 90 each Vinny Garcias MD [facility]/pharmacy #[id number] - P...      Dispensed Days Supply Quantity Provider Pharmacy   REPATHA 140 MG/ML SURECLICK 10/16/2024 84 6 mL Vinny Garcias MD [facility]/pharmacy #[id number] - P...

## 2025-01-08 NOTE — TELEPHONE ENCOUNTER
Patient requesting refills on Pantoprazole 40 mg, Levothyroxine 150 mcg, and Metformin 500 mg, all going to [facility] in [place].    Patient had to change pharmacies per his insurance.    Last appt: 06/24/2024  Next appt: 02/24/2025    Contact: [phone]

## 2025-01-09 RX ORDER — METOPROLOL SUCCINATE 50 MG/1
50 TABLET, EXTENDED RELEASE ORAL DAILY
Qty: 90 TABLET | Refills: 3 | Status: SHIPPED | OUTPATIENT
Start: 2025-01-09 | End: 2026-01-04

## 2025-01-09 RX ORDER — LOSARTAN POTASSIUM 25 MG/1
25 TABLET ORAL
Qty: 90 TABLET | Refills: 3 | Status: SHIPPED | OUTPATIENT
Start: 2025-01-09 | End: 2026-01-04

## 2025-01-09 RX ORDER — EVOLOCUMAB 140 MG/ML
140 INJECTION, SOLUTION SUBCUTANEOUS
Qty: 6 ML | Refills: 3 | Status: SHIPPED | OUTPATIENT
Start: 2025-01-09 | End: 2026-01-04

## 2025-01-09 RX ORDER — CHLORTHALIDONE 25 MG/1
25 TABLET ORAL DAILY
Qty: 90 TABLET | Refills: 3 | Status: SHIPPED | OUTPATIENT
Start: 2025-01-09 | End: 2026-01-04

## 2025-02-20 LAB
ALBUMIN SERPL-MCNC: 3.8 G/DL (ref 3.6–5.1)
ALBUMIN/CREAT UR: 1 MG/G CREAT
ALP SERPL-CCNC: 48 U/L (ref 35–144)
ALT SERPL-CCNC: 9 U/L (ref 9–46)
ANION GAP SERPL CALCULATED.4IONS-SCNC: 10 MMOL/L (CALC) (ref 7–17)
APPEARANCE UR: CLEAR
AST SERPL-CCNC: 25 U/L (ref 10–35)
BASOPHILS # BLD AUTO: 71 CELLS/UL (ref 0–200)
BASOPHILS NFR BLD AUTO: 1.2 %
BILIRUB SERPL-MCNC: 0.5 MG/DL (ref 0.2–1.2)
BILIRUB UR QL STRIP: NEGATIVE
BUN SERPL-MCNC: 21 MG/DL (ref 7–25)
CALCIUM SERPL-MCNC: 9.2 MG/DL (ref 8.6–10.3)
CHLORIDE SERPL-SCNC: 99 MMOL/L (ref 98–110)
CHOLEST SERPL-MCNC: 137 MG/DL
CHOLEST/HDLC SERPL: 3.6 (CALC)
CO2 SERPL-SCNC: 29 MMOL/L (ref 20–32)
COLOR UR: ABNORMAL
CREAT SERPL-MCNC: 1.14 MG/DL (ref 0.7–1.28)
CREAT UR-MCNC: 214 MG/DL (ref 20–320)
EGFRCR SERPLBLD CKD-EPI 2021: 67 ML/MIN/1.73M2
EOSINOPHIL # BLD AUTO: 460 CELLS/UL (ref 15–500)
EOSINOPHIL NFR BLD AUTO: 7.8 %
ERYTHROCYTE [DISTWIDTH] IN BLOOD BY AUTOMATED COUNT: 13.9 % (ref 11–15)
EST. AVERAGE GLUCOSE BLD GHB EST-MCNC: 183 MG/DL
EST. AVERAGE GLUCOSE BLD GHB EST-SCNC: 10.1 MMOL/L
GLUCOSE SERPL-MCNC: 172 MG/DL (ref 65–99)
GLUCOSE UR QL STRIP: NEGATIVE
HBA1C MFR BLD: 8 % OF TOTAL HGB
HCT VFR BLD AUTO: 44.9 % (ref 38.5–50)
HDLC SERPL-MCNC: 38 MG/DL
HGB BLD-MCNC: 15.2 G/DL (ref 13.2–17.1)
HGB UR QL STRIP: NEGATIVE
KETONES UR QL STRIP: ABNORMAL
LDLC SERPL CALC-MCNC: 74 MG/DL (CALC)
LEUKOCYTE ESTERASE UR QL STRIP: NEGATIVE
LYMPHOCYTES # BLD AUTO: 1670 CELLS/UL (ref 850–3900)
LYMPHOCYTES NFR BLD AUTO: 28.3 %
MCH RBC QN AUTO: 29.9 PG (ref 27–33)
MCHC RBC AUTO-ENTMCNC: 33.9 G/DL (ref 32–36)
MCV RBC AUTO: 88.4 FL (ref 80–100)
MICROALBUMIN UR-MCNC: 0.2 MG/DL
MONOCYTES # BLD AUTO: 549 CELLS/UL (ref 200–950)
MONOCYTES NFR BLD AUTO: 9.3 %
NEUTROPHILS # BLD AUTO: 3151 CELLS/UL (ref 1500–7800)
NEUTROPHILS NFR BLD AUTO: 53.4 %
NITRITE UR QL STRIP: NEGATIVE
NONHDLC SERPL-MCNC: 99 MG/DL (CALC)
PH UR STRIP: 5.5 [PH] (ref 5–8)
PLATELET # BLD AUTO: 175 THOUSAND/UL (ref 140–400)
PMV BLD REES-ECKER: 10.2 FL (ref 7.5–12.5)
POTASSIUM SERPL-SCNC: 3.7 MMOL/L (ref 3.5–5.3)
PROT SERPL-MCNC: 6.3 G/DL (ref 6.1–8.1)
PROT UR QL STRIP: NEGATIVE
RBC # BLD AUTO: 5.08 MILLION/UL (ref 4.2–5.8)
SODIUM SERPL-SCNC: 138 MMOL/L (ref 135–146)
SP GR UR STRIP: 1.04 (ref 1–1.03)
TRIGL SERPL-MCNC: 170 MG/DL
TSH SERPL-ACNC: 2.2 MIU/L (ref 0.4–4.5)
WBC # BLD AUTO: 5.9 THOUSAND/UL (ref 3.8–10.8)

## 2025-02-24 ENCOUNTER — TELEPHONE (OUTPATIENT)
Dept: PRIMARY CARE | Facility: CLINIC | Age: 75
End: 2025-02-24

## 2025-02-24 ENCOUNTER — APPOINTMENT (OUTPATIENT)
Dept: PRIMARY CARE | Facility: CLINIC | Age: 75
End: 2025-02-24
Payer: MEDICARE

## 2025-02-24 VITALS
BODY MASS INDEX: 41.09 KG/M2 | SYSTOLIC BLOOD PRESSURE: 120 MMHG | DIASTOLIC BLOOD PRESSURE: 68 MMHG | WEIGHT: 287 LBS | HEART RATE: 52 BPM | HEIGHT: 70 IN | RESPIRATION RATE: 18 BRPM | OXYGEN SATURATION: 93 %

## 2025-02-24 DIAGNOSIS — E03.9 HYPOTHYROIDISM, UNSPECIFIED TYPE: ICD-10-CM

## 2025-02-24 DIAGNOSIS — I25.10 ARTERIOSCLEROSIS OF CORONARY ARTERY: Primary | ICD-10-CM

## 2025-02-24 DIAGNOSIS — K21.9 CHRONIC GERD: ICD-10-CM

## 2025-02-24 DIAGNOSIS — E11.9 TYPE 2 DIABETES MELLITUS WITHOUT COMPLICATION, WITHOUT LONG-TERM CURRENT USE OF INSULIN (MULTI): ICD-10-CM

## 2025-02-24 DIAGNOSIS — I10 BENIGN ESSENTIAL HYPERTENSION: ICD-10-CM

## 2025-02-24 DIAGNOSIS — E78.00 HYPERCHOLESTEROLEMIA: ICD-10-CM

## 2025-02-24 DIAGNOSIS — C32.9 LARYNGEAL CANCER (MULTI): ICD-10-CM

## 2025-02-24 DIAGNOSIS — Z00.00 WELL ADULT EXAM: ICD-10-CM

## 2025-02-24 PROBLEM — R49.9 VOICE AND RESONANCE DISORDER: Status: ACTIVE | Noted: 2024-08-05

## 2025-02-24 PROBLEM — M47.816 LUMBAR SPONDYLOSIS: Status: ACTIVE | Noted: 2024-08-12

## 2025-02-24 PROBLEM — R13.13 PHARYNGEAL DYSPHAGIA: Status: ACTIVE | Noted: 2025-02-12

## 2025-02-24 PROBLEM — K11.7 DROOLING: Status: ACTIVE | Noted: 2024-08-05

## 2025-02-24 PROCEDURE — 3078F DIAST BP <80 MM HG: CPT | Performed by: FAMILY MEDICINE

## 2025-02-24 PROCEDURE — 1123F ACP DISCUSS/DSCN MKR DOCD: CPT | Performed by: FAMILY MEDICINE

## 2025-02-24 PROCEDURE — 3074F SYST BP LT 130 MM HG: CPT | Performed by: FAMILY MEDICINE

## 2025-02-24 PROCEDURE — 99212 OFFICE O/P EST SF 10 MIN: CPT | Performed by: FAMILY MEDICINE

## 2025-02-24 PROCEDURE — 99397 PER PM REEVAL EST PAT 65+ YR: CPT | Performed by: FAMILY MEDICINE

## 2025-02-24 PROCEDURE — 90656 IIV3 VACC NO PRSV 0.5 ML IM: CPT | Performed by: FAMILY MEDICINE

## 2025-02-24 PROCEDURE — 1157F ADVNC CARE PLAN IN RCRD: CPT | Performed by: FAMILY MEDICINE

## 2025-02-24 PROCEDURE — G0439 PPPS, SUBSEQ VISIT: HCPCS | Performed by: FAMILY MEDICINE

## 2025-02-24 PROCEDURE — 4010F ACE/ARB THERAPY RXD/TAKEN: CPT | Performed by: FAMILY MEDICINE

## 2025-02-24 PROCEDURE — 1159F MED LIST DOCD IN RCRD: CPT | Performed by: FAMILY MEDICINE

## 2025-02-24 PROCEDURE — 1170F FXNL STATUS ASSESSED: CPT | Performed by: FAMILY MEDICINE

## 2025-02-24 PROCEDURE — 1158F ADVNC CARE PLAN TLK DOCD: CPT | Performed by: FAMILY MEDICINE

## 2025-02-24 PROCEDURE — G0008 ADMIN INFLUENZA VIRUS VAC: HCPCS | Performed by: FAMILY MEDICINE

## 2025-02-24 PROCEDURE — 1126F AMNT PAIN NOTED NONE PRSNT: CPT | Performed by: FAMILY MEDICINE

## 2025-02-24 PROCEDURE — 1036F TOBACCO NON-USER: CPT | Performed by: FAMILY MEDICINE

## 2025-02-24 RX ORDER — METFORMIN HYDROCHLORIDE 1000 MG/1
1000 TABLET ORAL
Qty: 180 TABLET | Refills: 3 | Status: SHIPPED | OUTPATIENT
Start: 2025-02-24 | End: 2026-03-31

## 2025-02-24 ASSESSMENT — ACTIVITIES OF DAILY LIVING (ADL)
DRESSING: INDEPENDENT
MANAGING_FINANCES: INDEPENDENT
BATHING: INDEPENDENT
GROCERY_SHOPPING: INDEPENDENT
DOING_HOUSEWORK: INDEPENDENT
TAKING_MEDICATION: INDEPENDENT

## 2025-02-24 ASSESSMENT — ENCOUNTER SYMPTOMS
OCCASIONAL FEELINGS OF UNSTEADINESS: 1
LOSS OF SENSATION IN FEET: 0
DEPRESSION: 0

## 2025-02-24 ASSESSMENT — PATIENT HEALTH QUESTIONNAIRE - PHQ9
1. LITTLE INTEREST OR PLEASURE IN DOING THINGS: NOT AT ALL
2. FEELING DOWN, DEPRESSED OR HOPELESS: NOT AT ALL
SUM OF ALL RESPONSES TO PHQ9 QUESTIONS 1 AND 2: 0

## 2025-02-24 ASSESSMENT — PAIN SCALES - GENERAL: PAINLEVEL_OUTOF10: 0-NO PAIN

## 2025-02-24 NOTE — ASSESSMENT & PLAN NOTE
Continue Repatha through Dr. Garcias.  Cut back on fats in diet.  Recheck with me in 6 months.

## 2025-02-24 NOTE — ASSESSMENT & PLAN NOTE
Continue Toprol, Altace and chlorthalidone.  Recheck with me in 6 months.  Continue following with Dr. Garcias.

## 2025-02-24 NOTE — PROGRESS NOTES
Subjective   Reason for Visit: Florentin Aguayo is an 74 y.o. male here for a Medicare Wellness visit.          Reviewed all medications by prescribing practitioner or clinical pharmacist (such as prescriptions, OTCs, herbal therapies and supplements) and documented in the medical record.    HPI    Patient Care Team:  Lester Saenz MD as PCP - General  Lester Saenz MD as PCP - O Medicare Advantage PCP  Lester Saenz MD as Primary Care Provider   He has a history of  polyps on colonoscopy remotely.  Colonoscopy by Dr. Bello in 10/17 showed gastritis and colitis but no polyps.      2. FLORENTIN is seen today also for follow up of High cholesterol.  He is on Repatha through Dr. Garcias. (He was on a atorvastatin but this was switched to Crestor by Dr. Hahn.  He subsequently had a reaction including elevated creatinine kinase and has been off of this).   Recent LDL is 74.     3. FLORENTIN is here also for follow up of benign essential hypertension.  He is on Toprol, Altace and chorthalidone by Dr. Garcias.     4. FLORENTIN is seen also for follow up of Hypothyroidism.  He is on levothyroxine 175  mcg.   recent TSH is normal.     5. FLORENTIN is seen for also complaining of for GERD.  He is on Protonix.     6. FLORENTIN is seen today for follow-up of coronary artery disease.  He is S/P stent placement by Dr. Hahn. He has not followed by Dr. Garcias.     7. FLORENTIN is seen also for follow up of Diabetes 2, non insulin requiring.  He is on metformin 500 mg BID . He is following a ADA diet. Recent A1 c is 8.0..      8.   He is also here for follow up of sleep apnea.  He was using CPAP machine but has found it to be too difficult to where so he stopped using it.      9.   He  is also here for follow-up of anemia.  Gastrointestinal workup in 10/17 showed mild gastritis and colitis.   He was on iron but stopped this a year to go.  Recent hemoglobin is 15.2.      10. He is also here for follow-up of lumbar radiculitis.  He is  "status post lumbosacral fusion by Dr. Saleem in 10/22. He is now seeing Dr. Madrid.    11. He is here for follow up of laryngeal cancer.  Diagnosed in 1/2025.He is about to undergo radiation RX at [facility]    Review of Systems  Denies headache, blurred vision, chest pain, shortness of breath, nausea or vomiting, change in bowel habits or leg pain or swelling.    Objective   Vitals:  /68 (BP Location: Left arm, Patient Position: Sitting, BP Cuff Size: Large adult)   Pulse 52   Resp 18   Ht 1.778 m (5' 10\")   Wt 130 kg (287 lb)   SpO2 93%   BMI 41.18 kg/m²       Physical Exam  General appearance: Vital signs have been reviewed.  Comfortable.  He is overweight. He is alert and oriented x3 and appears his stated age.The patient is cooperative with exam.  Head: Hair pattern reveals a normal pattern for patient's age and face shows no abnormalities.  Eyes: PERRLA x2, EOMI x2, conjunctive a and sclera are clear.  Ears: External bilateral ears with normal helix, tragus and earlobe.Bilateral canals are normal.Bilateral tympanic membranes are pearly gray and landmarks are well visualized.  Nose: Nasal mucosa both nostrils reveals no polyps, ulcerations or lesions.  Throat:Teeth are in good repair.  Posterior pharynx reveals no abnormalities.  Neck: Supple without lymphadenopathy, thyromegaly or carotid bruits.  Lungs:Clear to auscultation bilaterally with no wheezes, rales or rhonchi.  Cardiovascular: RRR without MRG.No carotid bruits.  Extremities without edema and pulses are intact.  Abdomen: Soft, NT, no masses, no hepatosplenomegaly.  Genitalia: No testicular masses.  No evidence of inguinal hernia.Nontender.  Rectal: No masses.  Prostate is normal in size and shape with no nodules. It is nontender.  Musculoskeletal: 5/5 and equal strength in bilateral upper and lower extremities.  Skin: Good turgor and without rashes.  Neurological: Good overall strength and no focal deficits.  Cranial nerves II through XII are " grossly intact.  Psychiatric: Patient has appropriate judgment with good insight.  Mood is appropriate.    Assessment & Plan  Arteriosclerosis of coronary artery  Continue current medications following with Dr. Garcias.  Follow-up with me in 6 months.         Benign essential hypertension  Continue Toprol, Altace and chlorthalidone.  Recheck with me in 6 months.  Continue following with Dr. Garcias.         Hypercholesterolemia  Continue Repatha through Dr. Garcias.  Cut back on fats in diet.  Recheck with me in 6 months.         Hypothyroidism, unspecified type  Continue current dose of levothyroxine.  Recheck in 6 months.         Well adult exam         Type 2 diabetes mellitus without complication, without long-term current use of insulin (Multi)  Will increase metformin to 500 to thousand twice daily.  He is now following a Mediterranean diet since his laryngeal cancer was diagnosed.  Will recheck with me in 6 months.  Considered adding GLP-1 however I would not want to cut back on calorie state during his radiation treatment.    Orders:    metFORMIN (Glucophage) 1,000 mg tablet; Take 1 tablet (1,000 mg) by mouth 2 times daily (morning and late afternoon).    Chronic GERD  Continue Protonix.  Recheck with me in 6 months.         Laryngeal cancer (Multi)  Continue following with oncologist and ENT at [facility].

## 2025-02-24 NOTE — ASSESSMENT & PLAN NOTE
Will increase metformin to 500 to thousand twice daily.  He is now following a Mediterranean diet since his laryngeal cancer was diagnosed.  Will recheck with me in 6 months.  Considered adding GLP-1 however I would not want to cut back on calorie state during his radiation treatment.    Orders:    metFORMIN (Glucophage) 1,000 mg tablet; Take 1 tablet (1,000 mg) by mouth 2 times daily (morning and late afternoon).
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
## 2025-05-13 ENCOUNTER — APPOINTMENT (OUTPATIENT)
Dept: CARDIOLOGY | Facility: CLINIC | Age: 75
End: 2025-05-13
Payer: MEDICARE

## 2025-05-13 VITALS
SYSTOLIC BLOOD PRESSURE: 113 MMHG | RESPIRATION RATE: 18 BRPM | TEMPERATURE: 98.6 F | HEART RATE: 79 BPM | OXYGEN SATURATION: 95 % | DIASTOLIC BLOOD PRESSURE: 58 MMHG | HEIGHT: 71 IN | BODY MASS INDEX: 39.2 KG/M2 | WEIGHT: 280 LBS

## 2025-05-13 DIAGNOSIS — I25.118 CORONARY ARTERY DISEASE OF NATIVE ARTERY OF NATIVE HEART WITH STABLE ANGINA PECTORIS: ICD-10-CM

## 2025-05-13 DIAGNOSIS — E78.00 HYPERCHOLESTEROLEMIA: ICD-10-CM

## 2025-05-13 DIAGNOSIS — E78.2 MIXED HYPERLIPIDEMIA: ICD-10-CM

## 2025-05-13 DIAGNOSIS — I10 BENIGN ESSENTIAL HYPERTENSION: ICD-10-CM

## 2025-05-13 DIAGNOSIS — Z86.79 HISTORY OF HYPERTENSION: ICD-10-CM

## 2025-05-13 DIAGNOSIS — Z86.39 HISTORY OF DIABETES MELLITUS: ICD-10-CM

## 2025-05-13 DIAGNOSIS — G47.31 CENTRAL SLEEP APNEA SYNDROME: ICD-10-CM

## 2025-05-13 DIAGNOSIS — E11.9 TYPE 2 DIABETES MELLITUS WITHOUT COMPLICATION, WITHOUT LONG-TERM CURRENT USE OF INSULIN: Primary | ICD-10-CM

## 2025-05-13 DIAGNOSIS — G20.A2 PARKINSON'S DISEASE WITHOUT DYSKINESIA, WITH FLUCTUATING MANIFESTATIONS: ICD-10-CM

## 2025-05-13 DIAGNOSIS — I25.10 ARTERIOSCLEROSIS OF CORONARY ARTERY: ICD-10-CM

## 2025-05-13 PROCEDURE — 99214 OFFICE O/P EST MOD 30 MIN: CPT | Performed by: INTERNAL MEDICINE

## 2025-05-13 PROCEDURE — 1159F MED LIST DOCD IN RCRD: CPT | Performed by: INTERNAL MEDICINE

## 2025-05-13 PROCEDURE — 3074F SYST BP LT 130 MM HG: CPT | Performed by: INTERNAL MEDICINE

## 2025-05-13 PROCEDURE — 1036F TOBACCO NON-USER: CPT | Performed by: INTERNAL MEDICINE

## 2025-05-13 PROCEDURE — G2211 COMPLEX E/M VISIT ADD ON: HCPCS | Performed by: INTERNAL MEDICINE

## 2025-05-13 PROCEDURE — 4010F ACE/ARB THERAPY RXD/TAKEN: CPT | Performed by: INTERNAL MEDICINE

## 2025-05-13 PROCEDURE — 3078F DIAST BP <80 MM HG: CPT | Performed by: INTERNAL MEDICINE

## 2025-05-13 PROCEDURE — 1160F RVW MEDS BY RX/DR IN RCRD: CPT | Performed by: INTERNAL MEDICINE

## 2025-05-13 PROCEDURE — 1126F AMNT PAIN NOTED NONE PRSNT: CPT | Performed by: INTERNAL MEDICINE

## 2025-05-13 RX ORDER — METOPROLOL SUCCINATE 50 MG/1
50 TABLET, EXTENDED RELEASE ORAL DAILY
Qty: 90 TABLET | Refills: 3 | Status: SHIPPED | OUTPATIENT
Start: 2025-05-13 | End: 2026-05-08

## 2025-05-13 RX ORDER — SEMAGLUTIDE 0.68 MG/ML
0.5 INJECTION, SOLUTION SUBCUTANEOUS
Qty: 3 ML | Refills: 11 | Status: SHIPPED | OUTPATIENT
Start: 2025-05-13

## 2025-05-13 RX ORDER — CHLORTHALIDONE 25 MG/1
25 TABLET ORAL DAILY
Qty: 90 TABLET | Refills: 3 | Status: SHIPPED | OUTPATIENT
Start: 2025-05-13 | End: 2026-05-08

## 2025-05-13 RX ORDER — LOSARTAN POTASSIUM 25 MG/1
25 TABLET ORAL
Qty: 90 TABLET | Refills: 3 | Status: SHIPPED | OUTPATIENT
Start: 2025-05-13 | End: 2026-05-08

## 2025-05-13 ASSESSMENT — LIFESTYLE VARIABLES
AUDIT TOTAL SCORE: 2
HAS A RELATIVE, FRIEND, DOCTOR, OR ANOTHER HEALTH PROFESSIONAL EXPRESSED CONCERN ABOUT YOUR DRINKING OR SUGGESTED YOU CUT DOWN: NO
HOW OFTEN DURING THE LAST YEAR HAVE YOU BEEN UNABLE TO REMEMBER WHAT HAPPENED THE NIGHT BEFORE BECAUSE YOU HAD BEEN DRINKING: NEVER
HOW OFTEN DO YOU HAVE SIX OR MORE DRINKS ON ONE OCCASION: NEVER
HOW MANY STANDARD DRINKS CONTAINING ALCOHOL DO YOU HAVE ON A TYPICAL DAY: 1 OR 2
HOW OFTEN DURING THE LAST YEAR HAVE YOU FAILED TO DO WHAT WAS NORMALLY EXPECTED FROM YOU BECAUSE OF DRINKING: NEVER
HOW OFTEN DURING THE LAST YEAR HAVE YOU HAD A FEELING OF GUILT OR REMORSE AFTER DRINKING: NEVER
HOW OFTEN DURING THE LAST YEAR HAVE YOU NEEDED AN ALCOHOLIC DRINK FIRST THING IN THE MORNING TO GET YOURSELF GOING AFTER A NIGHT OF HEAVY DRINKING: NEVER
HOW OFTEN DO YOU HAVE A DRINK CONTAINING ALCOHOL: 2-4 TIMES A MONTH
HOW OFTEN DURING THE LAST YEAR HAVE YOU FOUND THAT YOU WERE NOT ABLE TO STOP DRINKING ONCE YOU HAD STARTED: NEVER
SKIP TO QUESTIONS 9-10: 1
AUDIT-C TOTAL SCORE: 2
HAVE YOU OR SOMEONE ELSE BEEN INJURED AS A RESULT OF YOUR DRINKING: NO

## 2025-05-13 ASSESSMENT — ENCOUNTER SYMPTOMS
LOSS OF SENSATION IN FEET: 0
OCCASIONAL FEELINGS OF UNSTEADINESS: 1
DEPRESSION: 0

## 2025-05-13 ASSESSMENT — PAIN SCALES - GENERAL: PAINLEVEL_OUTOF10: 0-NO PAIN
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
## 2025-05-30 ENCOUNTER — EVALUATION (OUTPATIENT)
Dept: PHYSICAL THERAPY | Facility: CLINIC | Age: 75
End: 2025-05-30
Payer: MEDICARE

## 2025-05-30 DIAGNOSIS — M48.062 SPINAL STENOSIS, LUMBAR REGION WITH NEUROGENIC CLAUDICATION: ICD-10-CM

## 2025-05-30 PROCEDURE — 97161 PT EVAL LOW COMPLEX 20 MIN: CPT | Mod: GP | Performed by: PHYSICAL THERAPIST

## 2025-05-30 PROCEDURE — 97530 THERAPEUTIC ACTIVITIES: CPT | Mod: GP | Performed by: PHYSICAL THERAPIST

## 2025-05-30 ASSESSMENT — ENCOUNTER SYMPTOMS
OCCASIONAL FEELINGS OF UNSTEADINESS: 1
LOSS OF SENSATION IN FEET: 0
DEPRESSION: 0

## 2025-05-30 ASSESSMENT — PAIN DESCRIPTION - DESCRIPTORS: DESCRIPTORS: STABBING

## 2025-05-30 ASSESSMENT — PAIN - FUNCTIONAL ASSESSMENT: PAIN_FUNCTIONAL_ASSESSMENT: 0-10

## 2025-05-30 ASSESSMENT — PAIN SCALES - GENERAL: PAINLEVEL_OUTOF10: 10 - WORST POSSIBLE PAIN
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
## 2025-06-05 ENCOUNTER — TREATMENT (OUTPATIENT)
Dept: PHYSICAL THERAPY | Facility: CLINIC | Age: 75
End: 2025-06-05
Payer: MEDICARE

## 2025-06-05 DIAGNOSIS — M48.062 SPINAL STENOSIS, LUMBAR REGION WITH NEUROGENIC CLAUDICATION: ICD-10-CM

## 2025-06-05 PROCEDURE — 97110 THERAPEUTIC EXERCISES: CPT | Mod: GP | Performed by: PHYSICAL THERAPIST

## 2025-06-05 ASSESSMENT — PAIN - FUNCTIONAL ASSESSMENT: PAIN_FUNCTIONAL_ASSESSMENT: 0-10

## 2025-06-05 ASSESSMENT — PAIN SCALES - GENERAL: PAINLEVEL_OUTOF10: 8

## 2025-06-06 ENCOUNTER — HOSPITAL ENCOUNTER (OUTPATIENT)
Dept: RADIOLOGY | Facility: CLINIC | Age: 75
Discharge: HOME | End: 2025-06-06
Payer: MEDICARE

## 2025-06-06 DIAGNOSIS — M48.062 SPINAL STENOSIS, LUMBAR REGION WITH NEUROGENIC CLAUDICATION: ICD-10-CM

## 2025-06-06 DIAGNOSIS — M47.816 SPONDYLOSIS WITHOUT MYELOPATHY OR RADICULOPATHY, LUMBAR REGION: ICD-10-CM

## 2025-06-06 PROCEDURE — 77073 BONE LENGTH STUDIES: CPT

## 2025-06-06 PROCEDURE — 77073 BONE LENGTH STUDIES: CPT | Performed by: RADIOLOGY
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
## 2025-06-11 ENCOUNTER — TREATMENT (OUTPATIENT)
Dept: PHYSICAL THERAPY | Facility: CLINIC | Age: 75
End: 2025-06-11
Payer: MEDICARE

## 2025-06-11 DIAGNOSIS — M48.062 SPINAL STENOSIS, LUMBAR REGION WITH NEUROGENIC CLAUDICATION: ICD-10-CM

## 2025-06-11 PROCEDURE — 97110 THERAPEUTIC EXERCISES: CPT | Mod: CQ,GP | Performed by: SPECIALIST/TECHNOLOGIST

## 2025-06-11 PROCEDURE — 97140 MANUAL THERAPY 1/> REGIONS: CPT | Mod: CQ,GP | Performed by: SPECIALIST/TECHNOLOGIST
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
## 2025-06-13 ENCOUNTER — TREATMENT (OUTPATIENT)
Dept: PHYSICAL THERAPY | Facility: CLINIC | Age: 75
End: 2025-06-13
Payer: MEDICARE

## 2025-06-13 DIAGNOSIS — M48.062 SPINAL STENOSIS, LUMBAR REGION WITH NEUROGENIC CLAUDICATION: ICD-10-CM

## 2025-06-13 PROCEDURE — 97140 MANUAL THERAPY 1/> REGIONS: CPT | Mod: CQ,GP | Performed by: SPECIALIST/TECHNOLOGIST

## 2025-06-13 PROCEDURE — 97110 THERAPEUTIC EXERCISES: CPT | Mod: CQ,GP | Performed by: SPECIALIST/TECHNOLOGIST
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
## 2025-06-23 ENCOUNTER — HOSPITAL ENCOUNTER (OUTPATIENT)
Dept: RADIOLOGY | Facility: CLINIC | Age: 75
Discharge: HOME | End: 2025-06-23
Payer: MEDICARE

## 2025-06-23 ENCOUNTER — TREATMENT (OUTPATIENT)
Dept: PHYSICAL THERAPY | Facility: CLINIC | Age: 75
End: 2025-06-23
Payer: MEDICARE

## 2025-06-23 DIAGNOSIS — M47.816 LUMBAR SPONDYLOSIS: ICD-10-CM

## 2025-06-23 DIAGNOSIS — M48.062 SPINAL STENOSIS, LUMBAR REGION WITH NEUROGENIC CLAUDICATION: ICD-10-CM

## 2025-06-23 PROCEDURE — 97110 THERAPEUTIC EXERCISES: CPT | Mod: GP,CQ

## 2025-06-23 PROCEDURE — 72110 X-RAY EXAM L-2 SPINE 4/>VWS: CPT

## 2025-06-23 PROCEDURE — 72110 X-RAY EXAM L-2 SPINE 4/>VWS: CPT | Performed by: RADIOLOGY

## 2025-06-23 ASSESSMENT — PAIN SCALES - GENERAL: PAINLEVEL_OUTOF10: 7

## 2025-06-23 ASSESSMENT — PAIN DESCRIPTION - DESCRIPTORS: DESCRIPTORS: SPASM
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
## 2025-06-25 ENCOUNTER — TREATMENT (OUTPATIENT)
Dept: PHYSICAL THERAPY | Facility: CLINIC | Age: 75
End: 2025-06-25
Payer: MEDICARE

## 2025-06-25 DIAGNOSIS — M48.062 SPINAL STENOSIS, LUMBAR REGION WITH NEUROGENIC CLAUDICATION: ICD-10-CM

## 2025-06-25 PROCEDURE — 97530 THERAPEUTIC ACTIVITIES: CPT | Mod: GP | Performed by: PHYSICAL THERAPIST

## 2025-06-25 ASSESSMENT — PAIN - FUNCTIONAL ASSESSMENT: PAIN_FUNCTIONAL_ASSESSMENT: 0-10

## 2025-06-25 ASSESSMENT — PAIN SCALES - GENERAL: PAINLEVEL_OUTOF10: 7
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
## 2025-06-30 ENCOUNTER — TREATMENT (OUTPATIENT)
Dept: PHYSICAL THERAPY | Facility: CLINIC | Age: 75
End: 2025-06-30
Payer: MEDICARE

## 2025-06-30 DIAGNOSIS — M48.062 SPINAL STENOSIS, LUMBAR REGION WITH NEUROGENIC CLAUDICATION: ICD-10-CM

## 2025-06-30 PROCEDURE — 97110 THERAPEUTIC EXERCISES: CPT | Mod: GP | Performed by: PHYSICAL THERAPIST

## 2025-06-30 ASSESSMENT — PAIN SCALES - GENERAL: PAINLEVEL_OUTOF10: 6

## 2025-06-30 ASSESSMENT — PAIN - FUNCTIONAL ASSESSMENT: PAIN_FUNCTIONAL_ASSESSMENT: 0-10
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
## 2025-07-02 ENCOUNTER — TREATMENT (OUTPATIENT)
Dept: PHYSICAL THERAPY | Facility: CLINIC | Age: 75
End: 2025-07-02
Payer: MEDICARE

## 2025-07-02 DIAGNOSIS — M48.062 SPINAL STENOSIS, LUMBAR REGION WITH NEUROGENIC CLAUDICATION: ICD-10-CM

## 2025-07-02 PROCEDURE — 97110 THERAPEUTIC EXERCISES: CPT | Mod: GP | Performed by: PHYSICAL THERAPIST

## 2025-07-02 ASSESSMENT — PAIN SCALES - GENERAL: PAINLEVEL_OUTOF10: 4

## 2025-07-02 ASSESSMENT — PAIN - FUNCTIONAL ASSESSMENT: PAIN_FUNCTIONAL_ASSESSMENT: 0-10
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
## 2025-07-07 ENCOUNTER — TREATMENT (OUTPATIENT)
Dept: PHYSICAL THERAPY | Facility: CLINIC | Age: 75
End: 2025-07-07
Payer: MEDICARE

## 2025-07-07 DIAGNOSIS — M48.062 SPINAL STENOSIS, LUMBAR REGION WITH NEUROGENIC CLAUDICATION: ICD-10-CM

## 2025-07-07 PROCEDURE — 97110 THERAPEUTIC EXERCISES: CPT | Mod: GP | Performed by: PHYSICAL THERAPIST

## 2025-07-07 ASSESSMENT — PAIN SCALES - GENERAL: PAINLEVEL_OUTOF10: 5 - MODERATE PAIN

## 2025-07-07 ASSESSMENT — PAIN - FUNCTIONAL ASSESSMENT: PAIN_FUNCTIONAL_ASSESSMENT: 0-10
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
## 2025-07-09 ENCOUNTER — TREATMENT (OUTPATIENT)
Dept: PHYSICAL THERAPY | Facility: CLINIC | Age: 75
End: 2025-07-09
Payer: MEDICARE

## 2025-07-09 DIAGNOSIS — M48.062 SPINAL STENOSIS, LUMBAR REGION WITH NEUROGENIC CLAUDICATION: ICD-10-CM

## 2025-07-09 PROCEDURE — 97110 THERAPEUTIC EXERCISES: CPT | Mod: GP | Performed by: PHYSICAL THERAPIST

## 2025-07-09 ASSESSMENT — PAIN SCALES - GENERAL: PAINLEVEL_OUTOF10: 7

## 2025-07-09 ASSESSMENT — PAIN - FUNCTIONAL ASSESSMENT: PAIN_FUNCTIONAL_ASSESSMENT: 0-10

## 2025-07-12 DIAGNOSIS — E03.9 HYPOTHYROIDISM, UNSPECIFIED TYPE: ICD-10-CM

## 2025-07-14 ENCOUNTER — APPOINTMENT (OUTPATIENT)
Dept: PHYSICAL THERAPY | Facility: CLINIC | Age: 75
End: 2025-07-14
Payer: MEDICARE

## 2025-07-14 RX ORDER — LEVOTHYROXINE SODIUM 150 UG/1
150 TABLET ORAL DAILY
Qty: 90 TABLET | Refills: 3 | Status: SHIPPED | OUTPATIENT
Start: 2025-07-14

## 2025-07-16 ENCOUNTER — APPOINTMENT (OUTPATIENT)
Dept: PHYSICAL THERAPY | Facility: CLINIC | Age: 75
End: 2025-07-16
Payer: MEDICARE

## 2025-07-21 ENCOUNTER — APPOINTMENT (OUTPATIENT)
Dept: PHYSICAL THERAPY | Facility: CLINIC | Age: 75
End: 2025-07-21
Payer: MEDICARE
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
## 2025-07-23 ENCOUNTER — TREATMENT (OUTPATIENT)
Dept: PHYSICAL THERAPY | Facility: CLINIC | Age: 75
End: 2025-07-23
Payer: MEDICARE

## 2025-07-23 DIAGNOSIS — M48.062 SPINAL STENOSIS, LUMBAR REGION WITH NEUROGENIC CLAUDICATION: ICD-10-CM

## 2025-07-23 PROCEDURE — 97110 THERAPEUTIC EXERCISES: CPT | Mod: GP | Performed by: PHYSICAL THERAPIST

## 2025-07-23 ASSESSMENT — PAIN - FUNCTIONAL ASSESSMENT: PAIN_FUNCTIONAL_ASSESSMENT: 0-10

## 2025-07-23 ASSESSMENT — PAIN SCALES - GENERAL: PAINLEVEL_OUTOF10: 4

## 2025-07-28 DIAGNOSIS — I10 BENIGN ESSENTIAL HYPERTENSION: ICD-10-CM

## 2025-07-28 DIAGNOSIS — E11.9 TYPE 2 DIABETES MELLITUS WITHOUT COMPLICATION, WITHOUT LONG-TERM CURRENT USE OF INSULIN: ICD-10-CM

## 2025-07-28 DIAGNOSIS — E78.00 HYPERCHOLESTEROLEMIA: ICD-10-CM

## 2025-08-18 DIAGNOSIS — K21.9 GASTROESOPHAGEAL REFLUX DISEASE WITHOUT ESOPHAGITIS: ICD-10-CM

## 2025-08-18 RX ORDER — PANTOPRAZOLE SODIUM 40 MG/1
40 TABLET, DELAYED RELEASE ORAL DAILY
Qty: 30 TABLET | Refills: 0 | Status: SHIPPED | OUTPATIENT
Start: 2025-08-18

## 2025-08-22 LAB
ALBUMIN SERPL-MCNC: 4 G/DL (ref 3.6–5.1)
ALP SERPL-CCNC: 45 U/L (ref 35–144)
ALT SERPL-CCNC: 16 U/L (ref 9–46)
ANION GAP SERPL CALCULATED.4IONS-SCNC: 12 MMOL/L (CALC) (ref 7–17)
AST SERPL-CCNC: 18 U/L (ref 10–35)
BILIRUB SERPL-MCNC: 0.7 MG/DL (ref 0.2–1.2)
BUN SERPL-MCNC: 35 MG/DL (ref 7–25)
CALCIUM SERPL-MCNC: 9.1 MG/DL (ref 8.6–10.3)
CHLORIDE SERPL-SCNC: 100 MMOL/L (ref 98–110)
CHOLEST SERPL-MCNC: 141 MG/DL
CHOLEST/HDLC SERPL: 4.5 (CALC)
CO2 SERPL-SCNC: 28 MMOL/L (ref 20–32)
CREAT SERPL-MCNC: 1.67 MG/DL (ref 0.7–1.28)
EGFRCR SERPLBLD CKD-EPI 2021: 42 ML/MIN/1.73M2
EST. AVERAGE GLUCOSE BLD GHB EST-MCNC: 123 MG/DL
EST. AVERAGE GLUCOSE BLD GHB EST-SCNC: 6.8 MMOL/L
GLUCOSE SERPL-MCNC: 102 MG/DL (ref 65–99)
HBA1C MFR BLD: 5.9 %
HDLC SERPL-MCNC: 31 MG/DL
LDLC SERPL CALC-MCNC: 83 MG/DL (CALC)
NONHDLC SERPL-MCNC: 110 MG/DL (CALC)
POTASSIUM SERPL-SCNC: 3.7 MMOL/L (ref 3.5–5.3)
PROT SERPL-MCNC: 6.3 G/DL (ref 6.1–8.1)
SODIUM SERPL-SCNC: 140 MMOL/L (ref 135–146)
TRIGL SERPL-MCNC: 168 MG/DL

## 2025-08-28 ENCOUNTER — TELEPHONE (OUTPATIENT)
Dept: PRIMARY CARE | Facility: CLINIC | Age: 75
End: 2025-08-28

## 2025-08-28 ENCOUNTER — APPOINTMENT (OUTPATIENT)
Dept: PRIMARY CARE | Facility: CLINIC | Age: 75
End: 2025-08-28
Payer: MEDICARE

## 2025-08-28 VITALS
SYSTOLIC BLOOD PRESSURE: 116 MMHG | TEMPERATURE: 97.1 F | WEIGHT: 249 LBS | OXYGEN SATURATION: 95 % | DIASTOLIC BLOOD PRESSURE: 68 MMHG | BODY MASS INDEX: 34.73 KG/M2 | HEART RATE: 73 BPM

## 2025-08-28 DIAGNOSIS — E03.9 HYPOTHYROIDISM, UNSPECIFIED TYPE: ICD-10-CM

## 2025-08-28 DIAGNOSIS — I10 BENIGN ESSENTIAL HYPERTENSION: Primary | ICD-10-CM

## 2025-08-28 DIAGNOSIS — B35.1 ONYCHOMYCOSIS DUE TO DERMATOPHYTE: ICD-10-CM

## 2025-08-28 DIAGNOSIS — K21.9 CHRONIC GERD: ICD-10-CM

## 2025-08-28 DIAGNOSIS — E11.9 TYPE 2 DIABETES MELLITUS WITHOUT COMPLICATION, WITHOUT LONG-TERM CURRENT USE OF INSULIN: ICD-10-CM

## 2025-08-28 DIAGNOSIS — E78.00 HYPERCHOLESTEROLEMIA: ICD-10-CM

## 2025-08-28 DIAGNOSIS — B35.1 ONYCHOMYCOSIS: ICD-10-CM

## 2025-08-28 DIAGNOSIS — I10 BENIGN ESSENTIAL HYPERTENSION: ICD-10-CM

## 2025-08-28 DIAGNOSIS — C32.9 LARYNGEAL CANCER (MULTI): ICD-10-CM

## 2025-08-28 PROCEDURE — G2211 COMPLEX E/M VISIT ADD ON: HCPCS | Performed by: FAMILY MEDICINE

## 2025-08-28 PROCEDURE — 3078F DIAST BP <80 MM HG: CPT | Performed by: FAMILY MEDICINE

## 2025-08-28 PROCEDURE — 1159F MED LIST DOCD IN RCRD: CPT | Performed by: FAMILY MEDICINE

## 2025-08-28 PROCEDURE — 1126F AMNT PAIN NOTED NONE PRSNT: CPT | Performed by: FAMILY MEDICINE

## 2025-08-28 PROCEDURE — 1036F TOBACCO NON-USER: CPT | Performed by: FAMILY MEDICINE

## 2025-08-28 PROCEDURE — 4010F ACE/ARB THERAPY RXD/TAKEN: CPT | Performed by: FAMILY MEDICINE

## 2025-08-28 PROCEDURE — 3074F SYST BP LT 130 MM HG: CPT | Performed by: FAMILY MEDICINE

## 2025-08-28 PROCEDURE — 99214 OFFICE O/P EST MOD 30 MIN: CPT | Performed by: FAMILY MEDICINE

## 2025-08-28 ASSESSMENT — PAIN SCALES - GENERAL: PAINLEVEL_OUTOF10: 0-NO PAIN

## 2025-12-01 ENCOUNTER — APPOINTMENT (OUTPATIENT)
Dept: CARDIOLOGY | Facility: CLINIC | Age: 75
End: 2025-12-01
Payer: MEDICARE

## 2026-03-17 ENCOUNTER — APPOINTMENT (OUTPATIENT)
Dept: PRIMARY CARE | Facility: CLINIC | Age: 76
End: 2026-03-17
Payer: MEDICARE